# Patient Record
Sex: FEMALE | Race: BLACK OR AFRICAN AMERICAN | NOT HISPANIC OR LATINO | URBAN - METROPOLITAN AREA
[De-identification: names, ages, dates, MRNs, and addresses within clinical notes are randomized per-mention and may not be internally consistent; named-entity substitution may affect disease eponyms.]

---

## 2018-10-22 ENCOUNTER — OUTPATIENT (OUTPATIENT)
Dept: OUTPATIENT SERVICES | Facility: HOSPITAL | Age: 83
LOS: 1 days | End: 2018-10-22
Payer: MEDICARE

## 2018-10-22 DIAGNOSIS — I49.9 CARDIAC ARRHYTHMIA, UNSPECIFIED: ICD-10-CM

## 2018-10-22 DIAGNOSIS — Z95.5 PRESENCE OF CORONARY ANGIOPLASTY IMPLANT AND GRAFT: Chronic | ICD-10-CM

## 2018-10-22 PROCEDURE — 93227 XTRNL ECG REC<48 HR R&I: CPT

## 2018-10-24 PROCEDURE — 93225 XTRNL ECG REC<48 HRS REC: CPT

## 2020-01-22 ENCOUNTER — EMERGENCY (EMERGENCY)
Facility: HOSPITAL | Age: 85
LOS: 1 days | Discharge: HOME | End: 2020-01-22
Attending: EMERGENCY MEDICINE | Admitting: EMERGENCY MEDICINE
Payer: MEDICARE

## 2020-01-22 VITALS
RESPIRATION RATE: 20 BRPM | HEART RATE: 93 BPM | TEMPERATURE: 99 F | SYSTOLIC BLOOD PRESSURE: 153 MMHG | OXYGEN SATURATION: 96 % | WEIGHT: 154.98 LBS | HEIGHT: 61 IN | DIASTOLIC BLOOD PRESSURE: 84 MMHG

## 2020-01-22 DIAGNOSIS — Z95.5 PRESENCE OF CORONARY ANGIOPLASTY IMPLANT AND GRAFT: Chronic | ICD-10-CM

## 2020-01-22 LAB
FLU A RESULT: SIGNIFICANT CHANGE UP
FLU A RESULT: SIGNIFICANT CHANGE UP
FLUAV AG NPH QL: SIGNIFICANT CHANGE UP
FLUBV AG NPH QL: SIGNIFICANT CHANGE UP
RSV RESULT: SIGNIFICANT CHANGE UP
RSV RNA RESP QL NAA+PROBE: SIGNIFICANT CHANGE UP

## 2020-01-22 PROCEDURE — 71046 X-RAY EXAM CHEST 2 VIEWS: CPT | Mod: 26

## 2020-01-22 PROCEDURE — 99284 EMERGENCY DEPT VISIT MOD MDM: CPT

## 2020-01-22 NOTE — ED PROVIDER NOTE - PROGRESS NOTE DETAILS
On re-evaluation, pt is AAox3 and in NAD. vitals stable. Pt well appearing in ED. Rx for augmentin given. Pt given augmentin po in ED. instructions given to follow up with pmd as outpatient, return to ED for worsening condition. Pt expresses understanding.

## 2020-01-22 NOTE — ED PROVIDER NOTE - OBJECTIVE STATEMENT
83 y/o F pt with PMHx of Afib, DM, and HTN, and PSHx of cardiac stents presents to ED c/o cough, cold, congestion x 2 days. Pt states she's had fever 2 days ago and her last fever was last night which improved after taking Tylenol. Pt endorses being concerned for the flu due to her age and didn't want it to worsen. Pt is well appearing and denies chest pain, shortness of breath, and any other acute complaints.

## 2020-01-22 NOTE — ED PROVIDER NOTE - CLINICAL SUMMARY MEDICAL DECISION MAKING FREE TEXT BOX
83 y/o F pt presents to ED with cough and congestion. Will obtain CXR to r/o pneumonia, labs, and reassess.

## 2020-01-22 NOTE — ED ADULT NURSE NOTE - OBJECTIVE STATEMENT
Pt presents complaining of flu like symptoms since monday, pt given mask in triage. PT reports brown thick mucous with occasional streaks of blood, fevers, and cough. PT reports taking tylenol at 4pm today. PT reports hx of 2 cardiac stents, htn, and DM.

## 2020-01-22 NOTE — ED ADULT TRIAGE NOTE - ARRIVAL INFO ADDITIONAL COMMENTS
cough with "bloody" sputum (takes warfarin), runny nose, sore throat, reports subjective fever, has been taking tylenol. denies nausea, vomiting, diarrhea, chest pain

## 2020-01-22 NOTE — ED PROVIDER NOTE - PATIENT PORTAL LINK FT
You can access the FollowMyHealth Patient Portal offered by [facility] by registering at the following website: [email or web address]. By joining [employer]’s FollowMyHealth portal, you will also be able to view your health information using other applications (apps) compatible with our system.

## 2020-01-23 VITALS
SYSTOLIC BLOOD PRESSURE: 145 MMHG | OXYGEN SATURATION: 98 % | DIASTOLIC BLOOD PRESSURE: 72 MMHG | HEART RATE: 82 BPM | RESPIRATION RATE: 20 BRPM | TEMPERATURE: 98 F

## 2020-01-23 PROCEDURE — 85025 COMPLETE CBC W/AUTO DIFF WBC: CPT

## 2020-01-23 PROCEDURE — 85730 THROMBOPLASTIN TIME PARTIAL: CPT

## 2020-01-23 PROCEDURE — 85610 PROTHROMBIN TIME: CPT

## 2020-01-23 PROCEDURE — 99283 EMERGENCY DEPT VISIT LOW MDM: CPT | Mod: 25

## 2020-01-23 PROCEDURE — 71046 X-RAY EXAM CHEST 2 VIEWS: CPT

## 2020-01-23 PROCEDURE — 80053 COMPREHEN METABOLIC PANEL: CPT

## 2020-01-23 PROCEDURE — 87631 RESP VIRUS 3-5 TARGETS: CPT

## 2020-01-23 PROCEDURE — 36415 COLL VENOUS BLD VENIPUNCTURE: CPT

## 2020-01-23 PROCEDURE — 82962 GLUCOSE BLOOD TEST: CPT

## 2020-01-23 RX ADMIN — Medication 1 TABLET(S): at 00:06

## 2020-01-27 DIAGNOSIS — J18.9 PNEUMONIA, UNSPECIFIED ORGANISM: ICD-10-CM

## 2020-01-27 DIAGNOSIS — R05 COUGH: ICD-10-CM

## 2020-01-30 ENCOUNTER — OUTPATIENT (OUTPATIENT)
Dept: OUTPATIENT SERVICES | Facility: HOSPITAL | Age: 85
LOS: 1 days | End: 2020-01-30
Payer: MEDICARE

## 2020-01-30 DIAGNOSIS — Z95.5 PRESENCE OF CORONARY ANGIOPLASTY IMPLANT AND GRAFT: Chronic | ICD-10-CM

## 2020-01-30 PROCEDURE — 71046 X-RAY EXAM CHEST 2 VIEWS: CPT

## 2020-01-30 PROCEDURE — 71046 X-RAY EXAM CHEST 2 VIEWS: CPT | Mod: 26

## 2021-07-07 ENCOUNTER — EMERGENCY (EMERGENCY)
Facility: HOSPITAL | Age: 86
LOS: 1 days | Discharge: HOME | End: 2021-07-07
Attending: EMERGENCY MEDICINE | Admitting: EMERGENCY MEDICINE
Payer: MEDICARE

## 2021-07-07 VITALS
HEART RATE: 82 BPM | RESPIRATION RATE: 18 BRPM | OXYGEN SATURATION: 99 % | DIASTOLIC BLOOD PRESSURE: 90 MMHG | SYSTOLIC BLOOD PRESSURE: 170 MMHG

## 2021-07-07 VITALS
SYSTOLIC BLOOD PRESSURE: 172 MMHG | OXYGEN SATURATION: 98 % | DIASTOLIC BLOOD PRESSURE: 79 MMHG | WEIGHT: 159.84 LBS | HEIGHT: 61 IN | HEART RATE: 79 BPM | TEMPERATURE: 98 F | RESPIRATION RATE: 18 BRPM

## 2021-07-07 DIAGNOSIS — I10 ESSENTIAL (PRIMARY) HYPERTENSION: ICD-10-CM

## 2021-07-07 DIAGNOSIS — E11.9 TYPE 2 DIABETES MELLITUS WITHOUT COMPLICATIONS: ICD-10-CM

## 2021-07-07 DIAGNOSIS — M54.12 RADICULOPATHY, CERVICAL REGION: ICD-10-CM

## 2021-07-07 DIAGNOSIS — Z95.5 PRESENCE OF CORONARY ANGIOPLASTY IMPLANT AND GRAFT: ICD-10-CM

## 2021-07-07 DIAGNOSIS — R20.0 ANESTHESIA OF SKIN: ICD-10-CM

## 2021-07-07 DIAGNOSIS — Z95.5 PRESENCE OF CORONARY ANGIOPLASTY IMPLANT AND GRAFT: Chronic | ICD-10-CM

## 2021-07-07 DIAGNOSIS — I48.91 UNSPECIFIED ATRIAL FIBRILLATION: ICD-10-CM

## 2021-07-07 DIAGNOSIS — Z20.822 CONTACT WITH AND (SUSPECTED) EXPOSURE TO COVID-19: ICD-10-CM

## 2021-07-07 DIAGNOSIS — Z79.84 LONG TERM (CURRENT) USE OF ORAL HYPOGLYCEMIC DRUGS: ICD-10-CM

## 2021-07-07 LAB
ALBUMIN SERPL ELPH-MCNC: 4.3 G/DL — SIGNIFICANT CHANGE UP (ref 3.3–5)
ALP SERPL-CCNC: 105 U/L — SIGNIFICANT CHANGE UP (ref 40–120)
ALT FLD-CCNC: SIGNIFICANT CHANGE UP U/L (ref 10–45)
ANION GAP SERPL CALC-SCNC: 12 MMOL/L — SIGNIFICANT CHANGE UP (ref 5–17)
APTT BLD: 43.5 SEC — HIGH (ref 27.5–35.5)
AST SERPL-CCNC: SIGNIFICANT CHANGE UP U/L (ref 10–40)
BASOPHILS # BLD AUTO: 0.01 K/UL — SIGNIFICANT CHANGE UP (ref 0–0.2)
BASOPHILS NFR BLD AUTO: 0.2 % — SIGNIFICANT CHANGE UP (ref 0–2)
BILIRUB SERPL-MCNC: 0.3 MG/DL — SIGNIFICANT CHANGE UP (ref 0.2–1.2)
BUN SERPL-MCNC: 11 MG/DL — SIGNIFICANT CHANGE UP (ref 7–23)
CALCIUM SERPL-MCNC: 9.4 MG/DL — SIGNIFICANT CHANGE UP (ref 8.4–10.5)
CHLORIDE SERPL-SCNC: 104 MMOL/L — SIGNIFICANT CHANGE UP (ref 96–108)
CO2 SERPL-SCNC: 27 MMOL/L — SIGNIFICANT CHANGE UP (ref 22–31)
CREAT SERPL-MCNC: 0.83 MG/DL — SIGNIFICANT CHANGE UP (ref 0.5–1.3)
EOSINOPHIL # BLD AUTO: 0.08 K/UL — SIGNIFICANT CHANGE UP (ref 0–0.5)
EOSINOPHIL NFR BLD AUTO: 1.6 % — SIGNIFICANT CHANGE UP (ref 0–6)
GLUCOSE SERPL-MCNC: 179 MG/DL — HIGH (ref 70–99)
HCT VFR BLD CALC: 36.9 % — SIGNIFICANT CHANGE UP (ref 34.5–45)
HGB BLD-MCNC: 11.6 G/DL — SIGNIFICANT CHANGE UP (ref 11.5–15.5)
IMM GRANULOCYTES NFR BLD AUTO: 0.4 % — SIGNIFICANT CHANGE UP (ref 0–1.5)
INR BLD: 2.05 — HIGH (ref 0.88–1.16)
LYMPHOCYTES # BLD AUTO: 1.17 K/UL — SIGNIFICANT CHANGE UP (ref 1–3.3)
LYMPHOCYTES # BLD AUTO: 22.8 % — SIGNIFICANT CHANGE UP (ref 13–44)
MCHC RBC-ENTMCNC: 28.9 PG — SIGNIFICANT CHANGE UP (ref 27–34)
MCHC RBC-ENTMCNC: 31.4 GM/DL — LOW (ref 32–36)
MCV RBC AUTO: 92 FL — SIGNIFICANT CHANGE UP (ref 80–100)
MONOCYTES # BLD AUTO: 0.33 K/UL — SIGNIFICANT CHANGE UP (ref 0–0.9)
MONOCYTES NFR BLD AUTO: 6.4 % — SIGNIFICANT CHANGE UP (ref 2–14)
NEUTROPHILS # BLD AUTO: 3.52 K/UL — SIGNIFICANT CHANGE UP (ref 1.8–7.4)
NEUTROPHILS NFR BLD AUTO: 68.6 % — SIGNIFICANT CHANGE UP (ref 43–77)
NRBC # BLD: 0 /100 WBCS — SIGNIFICANT CHANGE UP (ref 0–0)
PLATELET # BLD AUTO: 302 K/UL — SIGNIFICANT CHANGE UP (ref 150–400)
POTASSIUM SERPL-MCNC: SIGNIFICANT CHANGE UP MMOL/L (ref 3.5–5.3)
POTASSIUM SERPL-SCNC: SIGNIFICANT CHANGE UP MMOL/L (ref 3.5–5.3)
PROT SERPL-MCNC: 7.3 G/DL — SIGNIFICANT CHANGE UP (ref 6–8.3)
PROTHROM AB SERPL-ACNC: 23.8 SEC — HIGH (ref 10.6–13.6)
RBC # BLD: 4.01 M/UL — SIGNIFICANT CHANGE UP (ref 3.8–5.2)
RBC # FLD: 13.5 % — SIGNIFICANT CHANGE UP (ref 10.3–14.5)
SARS-COV-2 RNA SPEC QL NAA+PROBE: NEGATIVE — SIGNIFICANT CHANGE UP
SODIUM SERPL-SCNC: 143 MMOL/L — SIGNIFICANT CHANGE UP (ref 135–145)
TROPONIN T SERPL-MCNC: 0.01 NG/ML — SIGNIFICANT CHANGE UP (ref 0–0.01)
WBC # BLD: 5.13 K/UL — SIGNIFICANT CHANGE UP (ref 3.8–10.5)
WBC # FLD AUTO: 5.13 K/UL — SIGNIFICANT CHANGE UP (ref 3.8–10.5)

## 2021-07-07 PROCEDURE — 87635 SARS-COV-2 COVID-19 AMP PRB: CPT

## 2021-07-07 PROCEDURE — 99285 EMERGENCY DEPT VISIT HI MDM: CPT

## 2021-07-07 PROCEDURE — 85610 PROTHROMBIN TIME: CPT

## 2021-07-07 PROCEDURE — 99284 EMERGENCY DEPT VISIT MOD MDM: CPT | Mod: 25

## 2021-07-07 PROCEDURE — 93005 ELECTROCARDIOGRAM TRACING: CPT

## 2021-07-07 PROCEDURE — 93010 ELECTROCARDIOGRAM REPORT: CPT

## 2021-07-07 PROCEDURE — 80053 COMPREHEN METABOLIC PANEL: CPT

## 2021-07-07 PROCEDURE — 70450 CT HEAD/BRAIN W/O DYE: CPT

## 2021-07-07 PROCEDURE — 82962 GLUCOSE BLOOD TEST: CPT

## 2021-07-07 PROCEDURE — 85025 COMPLETE CBC W/AUTO DIFF WBC: CPT

## 2021-07-07 PROCEDURE — 85730 THROMBOPLASTIN TIME PARTIAL: CPT

## 2021-07-07 PROCEDURE — 36415 COLL VENOUS BLD VENIPUNCTURE: CPT

## 2021-07-07 PROCEDURE — 72125 CT NECK SPINE W/O DYE: CPT

## 2021-07-07 PROCEDURE — 84484 ASSAY OF TROPONIN QUANT: CPT

## 2021-07-07 PROCEDURE — 70450 CT HEAD/BRAIN W/O DYE: CPT | Mod: 26,MH

## 2021-07-07 PROCEDURE — 72125 CT NECK SPINE W/O DYE: CPT | Mod: 26,MH

## 2021-07-07 NOTE — ED PROVIDER NOTE - OBJECTIVE STATEMENT
86 F w n R arm pain/numbness int x 2 days- pain brought on w changes in head position- neck movement- noticed last night when moved her neck- felt a pressure like pain in R upper arm and numbness to hand- sx int last a few minutes improve when she's standing- worse w certain neck movements  no dizzy/cp/sob/lightheadedness  no meds taken for pain  no weakness/speach abnml  mod-severity

## 2021-07-07 NOTE — ED PROVIDER NOTE - CARE PROVIDER_API CALL
Raffi Woody)  Neurosurgery  [address], [place] [facility]  Phone: [phone]  Fax: [phone]  Follow Up Time:

## 2021-07-07 NOTE — ED PROVIDER NOTE - NSFOLLOWUPINSTRUCTIONS_ED_ALL_ED_FT
CERVICAL RADICULOPATHY - AfterCare(R) Instructions(ER/ED)           Cervical Radiculopathy    WHAT YOU NEED TO KNOW:    Cervical radiculopathy is a painful condition that happens when a spinal nerve in your neck is pinched or irritated.     Vertebral Column         DISCHARGE INSTRUCTIONS:    Medicines: You may need any of the following:  •NSAIDs help decrease swelling and pain. This medicine can be bought without a doctor's order. This medicine can cause stomach bleeding or kidney problems in certain people. If you take blood thinner medicine, always ask your healthcare provider if NSAIDs are safe for you. Always read the medicine label and follow the directions on it before using this medicine.      •Prescription pain medicine helps decrease pain. Do not wait until the pain is severe before you take this medicine.      •Steroids help decrease pain and swelling. These may be given as a pill or as an injection in your neck. You may need more than 1 injection if your symptoms do not improve after the first treatment.       •Take your medicine as directed. Contact your healthcare provider if you think your medicine is not helping or if you have side effects. Tell him of her if you are allergic to any medicine. Keep a list of the medicines, vitamins, and herbs you take. Include the amounts, and when and why you take them. Bring the list or the pill bottles to follow-up visits. Carry your medicine list with you in case of an emergency.      Follow up with your healthcare provider or spine specialist as directed: Write down your questions so you remember to ask them during your visits.     Physical therapy: Your healthcare provider may suggest physical therapy to stretch and strengthen your muscles. Your physical therapist can teach you how to improve your posture and the way you hold your neck. He may also teach you how to be safely active and avoid further injury. He can also help you develop an exercise program that is safe for your back and neck.     Self-care:   •Ice helps decrease swelling and pain. Ice may also help prevent tissue damage. Use an ice pack, or put crushed ice in a plastic bag. Cover it with a towel and place it on your neck for 15 to 20 minutes every hour or as directed.      •Rest when you feel it is needed. Slowly start to do more each day. Return to your daily activities as directed.       •Wear a soft collar. You may be given a soft collar to support your neck while you sleep. Wear the soft collar only as directed.  Cervical Collars           •Do light stretches and regular exercise. Your healthcare provider may suggest light stretches to help decrease stiffness in your neck and arm as you recover. After your pain is controlled, you may benefit from regular exercise. Ask what type of exercise is safe for your back and neck.       •Review your work area. A comfortable work area can help prevent neck strain. Ask your employer for an ergonomic review to check the position of your desk, chair, phone, and computer. Make any necessary adjustments for your comfort.       Contact your healthcare provider or spine specialist if:   •You have a fever.      •You are losing weight without trying.      •Your pain is worse, even with medicine.      •One or both hands feel more numb than before, or you cannot move your fingers well.      •You have questions or concerns about your condition or care.         © Copyright [employer] 2021           back to top                          © Copyright [employer] 2021

## 2021-07-07 NOTE — ED PROVIDER NOTE - CLINICAL SUMMARY MEDICAL DECISION MAKING FREE TEXT BOX
CT neg for stroke- clinically sx more c/w Cervical Radiculopathy- fu w SPine/PM & R as outpx  d/w Dr Bloom- agrees w plan

## 2021-07-07 NOTE — ED ADULT NURSE NOTE - OBJECTIVE STATEMENT
86y F, A&ox3, presents to ed for intermittent numbness to right arm with pain. PT reports worse with positioning of laying to right side. Pt endorses numbness to right arm traveling down, pt also endorses some intermittent numbness to left hand. No slurring of speech, no dizziness. Strength and sensation equal bilateral. Pt reports when pt sitting up, improvement.

## 2021-07-07 NOTE — ED ADULT TRIAGE NOTE - CHIEF COMPLAINT QUOTE
x 1 day of R arm numbness. Pt states " I had really bad numbness last night and I woke up with it. I don't know what time it started. I don't know the specific times." went to sleep around 11 pm yesterday and had the R arm numbness. does not recall the time she woke up today.

## 2021-07-07 NOTE — ED ADULT NURSE NOTE - NSIMPLEMENTINTERV_GEN_ALL_ED
Implemented All Fall with Harm Risk Interventions:  [place] to call system. Call bell, personal items and telephone within reach. Instruct patient to call for assistance. Room bathroom lighting operational. Non-slip footwear when patient is off stretcher. Physically safe environment: no spills, clutter or unnecessary equipment. Stretcher in lowest position, wheels locked, appropriate side rails in place. Provide visual cue, wrist band, yellow gown, etc. Monitor gait and stability. Monitor for mental status changes and reorient to person, place, and time. Review medications for side effects contributing to fall risk. Reinforce activity limits and safety measures with patient and family. Provide visual clues: red socks.

## 2021-07-07 NOTE — ED PROVIDER NOTE - PATIENT PORTAL LINK FT
You can access the FollowMyHealth Patient Portal offered by [facility] [place] by registering at the following website: [email or web address]. By joining [employer]’s FollowMyHealth portal, you will also be able to view your health information using other applications (apps) compatible with our system.

## 2021-08-13 ENCOUNTER — OUTPATIENT (OUTPATIENT)
Dept: OUTPATIENT SERVICES | Facility: HOSPITAL | Age: 86
LOS: 1 days | End: 2021-08-13
Payer: MEDICARE

## 2021-08-13 ENCOUNTER — APPOINTMENT (OUTPATIENT)
Dept: MRI IMAGING | Facility: HOSPITAL | Age: 86
End: 2021-08-13
Payer: MEDICARE

## 2021-08-13 DIAGNOSIS — Z95.5 PRESENCE OF CORONARY ANGIOPLASTY IMPLANT AND GRAFT: Chronic | ICD-10-CM

## 2021-08-13 PROCEDURE — 72141 MRI NECK SPINE W/O DYE: CPT | Mod: 26,MH

## 2021-08-13 PROCEDURE — 72141 MRI NECK SPINE W/O DYE: CPT

## 2022-05-18 ENCOUNTER — APPOINTMENT (OUTPATIENT)
Dept: HEART AND VASCULAR | Facility: CLINIC | Age: 87
End: 2022-05-18
Payer: MEDICARE

## 2022-05-18 ENCOUNTER — NON-APPOINTMENT (OUTPATIENT)
Age: 87
End: 2022-05-18

## 2022-05-18 VITALS
WEIGHT: 153 LBS | BODY MASS INDEX: 28.89 KG/M2 | HEART RATE: 69 BPM | DIASTOLIC BLOOD PRESSURE: 72 MMHG | HEIGHT: 61 IN | TEMPERATURE: 97.2 F | SYSTOLIC BLOOD PRESSURE: 145 MMHG

## 2022-05-18 DIAGNOSIS — Z83.3 FAMILY HISTORY OF DIABETES MELLITUS: ICD-10-CM

## 2022-05-18 DIAGNOSIS — Z82.0 FAMILY HISTORY OF EPILEPSY AND OTHER DISEASES OF THE NERVOUS SYSTEM: ICD-10-CM

## 2022-05-18 PROCEDURE — 99204 OFFICE O/P NEW MOD 45 MIN: CPT | Mod: 25

## 2022-05-18 PROCEDURE — 93000 ELECTROCARDIOGRAM COMPLETE: CPT

## 2022-05-18 PROCEDURE — 36415 COLL VENOUS BLD VENIPUNCTURE: CPT

## 2022-05-18 NOTE — PHYSICAL EXAM

## 2022-05-18 NOTE — REVIEW OF SYSTEMS
[Feeling Fatigued] : feeling fatigued [Dyspnea on exertion] : dyspnea during exertion [Palpitations] : palpitations [Joint Pain] : joint pain [Negative] : Heme/Lymph

## 2022-05-18 NOTE — HISTORY OF PRESENT ILLNESS
[FreeTextEntry1] : pt is a daibetic on metformen pt has atrial fibrillation presented she might have had a small strokm3 years ago pt on metformen 100mg po bid amlodipine 10mg po qd atenolol 50mg po bid and warfarin 2.5 x5 5 x2 days\par Simran narayan

## 2022-05-18 NOTE — ASSESSMENT
[FreeTextEntry1] : Patient with history of atrial fibrillation diabetes and hypertension patient appears to have had a prolonged TIA patient is on Coumadin we will check INR today and will schedule echocardiogram next visit will obtain records from Dr. Bloom

## 2022-05-19 ENCOUNTER — NON-APPOINTMENT (OUTPATIENT)
Age: 87
End: 2022-05-19

## 2022-05-25 ENCOUNTER — NON-APPOINTMENT (OUTPATIENT)
Age: 87
End: 2022-05-25

## 2022-06-06 LAB
ALBUMIN SERPL ELPH-MCNC: 4.5 G/DL
ALP BLD-CCNC: 108 U/L
ALT SERPL-CCNC: 13 U/L
ANION GAP SERPL CALC-SCNC: 10 MMOL/L
APTT BLD: 47.4 SEC
AST SERPL-CCNC: 21 U/L
BASOPHILS # BLD AUTO: 0.01 K/UL
BASOPHILS NFR BLD AUTO: 0.2 %
BILIRUB SERPL-MCNC: 0.3 MG/DL
BUN SERPL-MCNC: 12 MG/DL
CALCIUM SERPL-MCNC: 9.3 MG/DL
CHLORIDE SERPL-SCNC: 107 MMOL/L
CHOLEST SERPL-MCNC: 170 MG/DL
CK SERPL-CCNC: 92 U/L
CO2 SERPL-SCNC: 28 MMOL/L
CREAT SERPL-MCNC: 0.9 MG/DL
EGFR: 62 ML/MIN/1.73M2
EOSINOPHIL # BLD AUTO: 0.05 K/UL
EOSINOPHIL NFR BLD AUTO: 0.9 %
ESTIMATED AVERAGE GLUCOSE: 134 MG/DL
FERRITIN SERPL-MCNC: 15 NG/ML
GLUCOSE SERPL-MCNC: 131 MG/DL
HBA1C MFR BLD HPLC: 6.3 %
HCT VFR BLD CALC: 39.5 %
HDLC SERPL-MCNC: 44 MG/DL
HGB BLD-MCNC: 11.7 G/DL
IMM GRANULOCYTES NFR BLD AUTO: 0.6 %
INR PPP: 2.07 RATIO
LDLC SERPL CALC-MCNC: 108 MG/DL
LYMPHOCYTES # BLD AUTO: 1.44 K/UL
LYMPHOCYTES NFR BLD AUTO: 27.2 %
MAN DIFF?: NORMAL
MCHC RBC-ENTMCNC: 28.1 PG
MCHC RBC-ENTMCNC: 29.6 GM/DL
MCV RBC AUTO: 95 FL
MONOCYTES # BLD AUTO: 0.45 K/UL
MONOCYTES NFR BLD AUTO: 8.5 %
NEUTROPHILS # BLD AUTO: 3.31 K/UL
NEUTROPHILS NFR BLD AUTO: 62.6 %
NONHDLC SERPL-MCNC: 125 MG/DL
PLATELET # BLD AUTO: 311 K/UL
POTASSIUM SERPL-SCNC: 4.6 MMOL/L
PROT SERPL-MCNC: 6.9 G/DL
PT BLD: 24.4 SEC
RBC # BLD: 4.16 M/UL
RBC # FLD: 14.5 %
SODIUM SERPL-SCNC: 146 MMOL/L
TRIGL SERPL-MCNC: 88 MG/DL
TSH SERPL-ACNC: 1.5 UIU/ML
VIT B12 SERPL-MCNC: 202 PG/ML
WBC # FLD AUTO: 5.29 K/UL

## 2022-06-13 ENCOUNTER — APPOINTMENT (OUTPATIENT)
Dept: HEART AND VASCULAR | Facility: CLINIC | Age: 87
End: 2022-06-13

## 2022-06-13 PROCEDURE — 96372 THER/PROPH/DIAG INJ SC/IM: CPT

## 2022-06-28 ENCOUNTER — NON-APPOINTMENT (OUTPATIENT)
Age: 87
End: 2022-06-28

## 2022-06-28 ENCOUNTER — APPOINTMENT (OUTPATIENT)
Dept: HEART AND VASCULAR | Facility: CLINIC | Age: 87
End: 2022-06-28

## 2022-06-28 VITALS
BODY MASS INDEX: 28.7 KG/M2 | HEIGHT: 61 IN | DIASTOLIC BLOOD PRESSURE: 87 MMHG | TEMPERATURE: 96.8 F | WEIGHT: 152 LBS | OXYGEN SATURATION: 98 % | SYSTOLIC BLOOD PRESSURE: 147 MMHG | HEART RATE: 68 BPM

## 2022-06-28 PROCEDURE — 93000 ELECTROCARDIOGRAM COMPLETE: CPT

## 2022-06-28 PROCEDURE — 93306 TTE W/DOPPLER COMPLETE: CPT

## 2022-06-28 PROCEDURE — 36415 COLL VENOUS BLD VENIPUNCTURE: CPT

## 2022-06-28 PROCEDURE — 96372 THER/PROPH/DIAG INJ SC/IM: CPT

## 2022-06-28 PROCEDURE — 99214 OFFICE O/P EST MOD 30 MIN: CPT | Mod: 25

## 2022-06-28 RX ADMIN — CYANOCOBALAMIN 0 MCG/ML: 1000 INJECTION INTRAMUSCULAR; SUBCUTANEOUS at 00:00

## 2022-06-28 NOTE — REASON FOR VISIT
[CV Risk Factors and Non-Cardiac Disease] : CV risk factors and non-cardiac disease [Arrhythmia/ECG Abnorrmalities] : arrhythmia/ECG abnormalities [Structural Heart and Valve Disease] : structural heart and valve disease

## 2022-06-28 NOTE — ASSESSMENT
[FreeTextEntry1] : Patient with history of atrial fibrillation diabetes and hypertension patient appears to have had a prolonged TIA patient is on Coumadin inr 2.07  pt s/p stent to  rca  echocardiogram shows mr dilated la pt given b12 for 203 check inr today

## 2022-06-28 NOTE — PHYSICAL EXAM

## 2022-06-30 LAB
ALBUMIN SERPL ELPH-MCNC: 4.7 G/DL
ALP BLD-CCNC: 106 U/L
ALT SERPL-CCNC: 12 U/L
ANION GAP SERPL CALC-SCNC: 12 MMOL/L
AST SERPL-CCNC: 21 U/L
BASOPHILS # BLD AUTO: 0.02 K/UL
BASOPHILS NFR BLD AUTO: 0.4 %
BILIRUB SERPL-MCNC: 0.3 MG/DL
BUN SERPL-MCNC: 12 MG/DL
CALCIUM SERPL-MCNC: 9.3 MG/DL
CHLORIDE SERPL-SCNC: 105 MMOL/L
CO2 SERPL-SCNC: 28 MMOL/L
CREAT SERPL-MCNC: 0.95 MG/DL
EGFR: 58 ML/MIN/1.73M2
EOSINOPHIL # BLD AUTO: 0.06 K/UL
EOSINOPHIL NFR BLD AUTO: 1.3 %
GLUCOSE SERPL-MCNC: 123 MG/DL
HCT VFR BLD CALC: 39.1 %
HGB BLD-MCNC: 11.7 G/DL
IMM GRANULOCYTES NFR BLD AUTO: 0.4 %
INR PPP: 1.69 RATIO
LYMPHOCYTES # BLD AUTO: 1.55 K/UL
LYMPHOCYTES NFR BLD AUTO: 33.5 %
MAN DIFF?: NORMAL
MCHC RBC-ENTMCNC: 28.1 PG
MCHC RBC-ENTMCNC: 29.9 GM/DL
MCV RBC AUTO: 94 FL
MONOCYTES # BLD AUTO: 0.42 K/UL
MONOCYTES NFR BLD AUTO: 9.1 %
NEUTROPHILS # BLD AUTO: 2.55 K/UL
NEUTROPHILS NFR BLD AUTO: 55.3 %
PLATELET # BLD AUTO: 273 K/UL
POTASSIUM SERPL-SCNC: 4 MMOL/L
PROT SERPL-MCNC: 7.2 G/DL
PT BLD: 20.1 SEC
RBC # BLD: 4.16 M/UL
RBC # FLD: 14.4 %
SARS-COV-2 N GENE NPH QL NAA+PROBE: NOT DETECTED
SODIUM SERPL-SCNC: 144 MMOL/L
WBC # FLD AUTO: 4.62 K/UL

## 2022-07-09 ENCOUNTER — NON-APPOINTMENT (OUTPATIENT)
Age: 87
End: 2022-07-09

## 2022-08-09 ENCOUNTER — APPOINTMENT (OUTPATIENT)
Dept: HEART AND VASCULAR | Facility: CLINIC | Age: 87
End: 2022-08-09

## 2022-08-09 VITALS
OXYGEN SATURATION: 98 % | WEIGHT: 152 LBS | BODY MASS INDEX: 28.7 KG/M2 | TEMPERATURE: 97.7 F | DIASTOLIC BLOOD PRESSURE: 84 MMHG | HEART RATE: 80 BPM | SYSTOLIC BLOOD PRESSURE: 152 MMHG | HEIGHT: 61 IN

## 2022-08-09 PROCEDURE — 36415 COLL VENOUS BLD VENIPUNCTURE: CPT

## 2022-08-09 PROCEDURE — 99214 OFFICE O/P EST MOD 30 MIN: CPT | Mod: 25

## 2022-08-09 PROCEDURE — 93000 ELECTROCARDIOGRAM COMPLETE: CPT

## 2022-08-09 PROCEDURE — 96372 THER/PROPH/DIAG INJ SC/IM: CPT

## 2022-08-09 RX ORDER — CYANOCOBALAMIN 1000 UG/ML
1000 INJECTION INTRAMUSCULAR; SUBCUTANEOUS
Qty: 0 | Refills: 0 | Status: COMPLETED | OUTPATIENT
Start: 2022-08-09

## 2022-08-09 RX ADMIN — CYANOCOBALAMIN 0 MCG/ML: 1000 INJECTION INTRAMUSCULAR; SUBCUTANEOUS at 00:00

## 2022-08-10 LAB
ALBUMIN SERPL ELPH-MCNC: 4.4 G/DL
ALP BLD-CCNC: 113 U/L
ALT SERPL-CCNC: 12 U/L
ANION GAP SERPL CALC-SCNC: 13 MMOL/L
APTT BLD: 51.2 SEC
AST SERPL-CCNC: 20 U/L
BASOPHILS # BLD AUTO: 0.01 K/UL
BASOPHILS NFR BLD AUTO: 0.2 %
BILIRUB SERPL-MCNC: 0.3 MG/DL
BUN SERPL-MCNC: 10 MG/DL
CALCIUM SERPL-MCNC: 9.6 MG/DL
CHLORIDE SERPL-SCNC: 106 MMOL/L
CO2 SERPL-SCNC: 28 MMOL/L
CREAT SERPL-MCNC: 0.84 MG/DL
EGFR: 68 ML/MIN/1.73M2
EOSINOPHIL # BLD AUTO: 0.08 K/UL
EOSINOPHIL NFR BLD AUTO: 1.5 %
ESTIMATED AVERAGE GLUCOSE: 128 MG/DL
GLUCOSE SERPL-MCNC: 98 MG/DL
HBA1C MFR BLD HPLC: 6.1 %
HCT VFR BLD CALC: 40.3 %
HGB BLD-MCNC: 12 G/DL
IMM GRANULOCYTES NFR BLD AUTO: 0.4 %
INR PPP: 2.75 RATIO
LYMPHOCYTES # BLD AUTO: 1.51 K/UL
LYMPHOCYTES NFR BLD AUTO: 27.9 %
MAN DIFF?: NORMAL
MCHC RBC-ENTMCNC: 28.5 PG
MCHC RBC-ENTMCNC: 29.8 GM/DL
MCV RBC AUTO: 95.7 FL
MONOCYTES # BLD AUTO: 0.46 K/UL
MONOCYTES NFR BLD AUTO: 8.5 %
NEUTROPHILS # BLD AUTO: 3.33 K/UL
NEUTROPHILS NFR BLD AUTO: 61.5 %
PLATELET # BLD AUTO: 297 K/UL
POTASSIUM SERPL-SCNC: 3.8 MMOL/L
PROT SERPL-MCNC: 7.3 G/DL
PT BLD: 32.2 SEC
RBC # BLD: 4.21 M/UL
RBC # FLD: 14.7 %
SODIUM SERPL-SCNC: 146 MMOL/L
VIT B12 SERPL-MCNC: 453 PG/ML
WBC # FLD AUTO: 5.41 K/UL

## 2022-08-10 NOTE — ASSESSMENT
[FreeTextEntry1] : Patient with history of atrial fibrillation diabetes and hypertension patient appears to have had a prolonged TIA patient is on Coumadin inr 2.85  pt s/p stent to  rca  echocardiogram shows mr dilated la pt  felt better after b12 will give monthly

## 2022-08-10 NOTE — PHYSICAL EXAM

## 2022-08-21 ENCOUNTER — FORM ENCOUNTER (OUTPATIENT)
Age: 87
End: 2022-08-21

## 2022-09-20 ENCOUNTER — APPOINTMENT (OUTPATIENT)
Dept: HEART AND VASCULAR | Facility: CLINIC | Age: 87
End: 2022-09-20

## 2022-09-20 VITALS
SYSTOLIC BLOOD PRESSURE: 159 MMHG | HEART RATE: 74 BPM | WEIGHT: 152 LBS | BODY MASS INDEX: 29.84 KG/M2 | OXYGEN SATURATION: 96 % | HEIGHT: 60 IN | DIASTOLIC BLOOD PRESSURE: 78 MMHG | TEMPERATURE: 97.2 F

## 2022-09-20 VITALS — SYSTOLIC BLOOD PRESSURE: 143 MMHG | DIASTOLIC BLOOD PRESSURE: 79 MMHG

## 2022-09-20 PROCEDURE — 36415 COLL VENOUS BLD VENIPUNCTURE: CPT

## 2022-09-20 PROCEDURE — 99213 OFFICE O/P EST LOW 20 MIN: CPT

## 2022-09-20 RX ORDER — CYANOCOBALAMIN 1000 UG/ML
1000 INJECTION INTRAMUSCULAR; SUBCUTANEOUS
Qty: 0 | Refills: 0 | Status: COMPLETED | OUTPATIENT
Start: 2022-06-06

## 2022-09-20 RX ADMIN — CYANOCOBALAMIN 0 MCG/ML: 1000 INJECTION INTRAMUSCULAR; SUBCUTANEOUS at 00:00

## 2022-09-20 NOTE — PHYSICAL EXAM

## 2022-09-20 NOTE — ASSESSMENT
[FreeTextEntry1] : Patient with history of atrial fibrillation diabetes and hypertension patient appears to have had a prolonged TIA patient is on Coumadin inr 2.75  pt s/p stent to  rca  echocardiogram shows mr dilated la pt  felt better after b12 will give monthly

## 2022-09-21 LAB
ALBUMIN SERPL ELPH-MCNC: 4.6 G/DL
ALP BLD-CCNC: 106 U/L
ALT SERPL-CCNC: 15 U/L
ANION GAP SERPL CALC-SCNC: 11 MMOL/L
AST SERPL-CCNC: 22 U/L
BASOPHILS # BLD AUTO: 0.01 K/UL
BASOPHILS NFR BLD AUTO: 0.2 %
BILIRUB SERPL-MCNC: 0.3 MG/DL
BUN SERPL-MCNC: 15 MG/DL
CALCIUM SERPL-MCNC: 9.4 MG/DL
CHLORIDE SERPL-SCNC: 105 MMOL/L
CO2 SERPL-SCNC: 29 MMOL/L
CREAT SERPL-MCNC: 0.91 MG/DL
EGFR: 61 ML/MIN/1.73M2
EOSINOPHIL # BLD AUTO: 0.05 K/UL
EOSINOPHIL NFR BLD AUTO: 0.9 %
GLUCOSE SERPL-MCNC: 90 MG/DL
HCT VFR BLD CALC: 39.7 %
HGB BLD-MCNC: 12.3 G/DL
IMM GRANULOCYTES NFR BLD AUTO: 0.4 %
INR PPP: 2.76 RATIO
LYMPHOCYTES # BLD AUTO: 1.53 K/UL
LYMPHOCYTES NFR BLD AUTO: 28.8 %
MAN DIFF?: NORMAL
MCHC RBC-ENTMCNC: 28.5 PG
MCHC RBC-ENTMCNC: 31 GM/DL
MCV RBC AUTO: 92.1 FL
MONOCYTES # BLD AUTO: 0.44 K/UL
MONOCYTES NFR BLD AUTO: 8.3 %
NEUTROPHILS # BLD AUTO: 3.26 K/UL
NEUTROPHILS NFR BLD AUTO: 61.4 %
PLATELET # BLD AUTO: 328 K/UL
POTASSIUM SERPL-SCNC: 3.7 MMOL/L
PROT SERPL-MCNC: 7.1 G/DL
PT BLD: 32.9 SEC
RBC # BLD: 4.31 M/UL
RBC # FLD: 14.6 %
SODIUM SERPL-SCNC: 144 MMOL/L
VIT B12 SERPL-MCNC: 378 PG/ML
WBC # FLD AUTO: 5.31 K/UL

## 2022-11-01 ENCOUNTER — APPOINTMENT (OUTPATIENT)
Dept: HEART AND VASCULAR | Facility: CLINIC | Age: 87
End: 2022-11-01

## 2022-11-01 VITALS
WEIGHT: 144 LBS | HEIGHT: 60 IN | DIASTOLIC BLOOD PRESSURE: 68 MMHG | BODY MASS INDEX: 28.27 KG/M2 | HEART RATE: 62 BPM | SYSTOLIC BLOOD PRESSURE: 135 MMHG

## 2022-11-01 PROCEDURE — 99213 OFFICE O/P EST LOW 20 MIN: CPT | Mod: 25

## 2022-11-01 PROCEDURE — 96372 THER/PROPH/DIAG INJ SC/IM: CPT

## 2022-11-01 PROCEDURE — 36415 COLL VENOUS BLD VENIPUNCTURE: CPT

## 2022-11-01 RX ORDER — CYANOCOBALAMIN 1000 UG/ML
1000 INJECTION INTRAMUSCULAR; SUBCUTANEOUS
Qty: 0 | Refills: 0 | Status: COMPLETED | OUTPATIENT
Start: 2022-11-01

## 2022-11-01 RX ADMIN — CYANOCOBALAMIN 0 MCG/ML: 1000 INJECTION INTRAMUSCULAR; SUBCUTANEOUS at 00:00

## 2022-11-02 LAB
INR PPP: 2.05 RATIO
PT BLD: 23.9 SEC

## 2022-11-04 NOTE — PHYSICAL EXAM

## 2022-11-04 NOTE — ASSESSMENT
[FreeTextEntry1] : Patient with history of atrial fibrillation diabetes and hypertension patient appears to have had a prolonged TIA patient is on Coumadin inr 2.05  pt s/p stent to  rca  echocardiogram shows mr dilated la pt  felt better after b12 will give monthly

## 2022-12-13 ENCOUNTER — APPOINTMENT (OUTPATIENT)
Dept: HEART AND VASCULAR | Facility: CLINIC | Age: 87
End: 2022-12-13

## 2022-12-13 VITALS
HEART RATE: 74 BPM | SYSTOLIC BLOOD PRESSURE: 134 MMHG | WEIGHT: 148 LBS | TEMPERATURE: 97.7 F | BODY MASS INDEX: 29.06 KG/M2 | DIASTOLIC BLOOD PRESSURE: 69 MMHG | HEIGHT: 60 IN | OXYGEN SATURATION: 100 %

## 2022-12-13 PROCEDURE — 99214 OFFICE O/P EST MOD 30 MIN: CPT | Mod: 25

## 2022-12-13 PROCEDURE — 36415 COLL VENOUS BLD VENIPUNCTURE: CPT

## 2022-12-13 PROCEDURE — 96372 THER/PROPH/DIAG INJ SC/IM: CPT

## 2022-12-13 RX ORDER — WARFARIN 4 MG/1
TABLET ORAL
Refills: 0 | Status: COMPLETED | COMMUNITY
End: 2022-12-13

## 2022-12-13 NOTE — HISTORY OF PRESENT ILLNESS
[FreeTextEntry1] : 87 year old female with PMHX of CAD ( PCI x2  2011 ), AFIB ( Warfarin ) HTN, HLD and DMII here for follow up.\par \par Since last visit, she is overall of feeling well. She continues to walk 10-15 blocks without complications. She denies any chest pains or dyspnea on exertion. \par \par She has occasional edema which resolves with leg elevation. She denies any orthopnea or pnd or cough. \par \par She denies any palpitations, dizziness, syncope or neuro focal deficits. \par \par ECHO 6/2022\par Mild - MOd MR, Min AR, mildly dilated left atrium, mild LVH, EF  56%, normal wall motion

## 2022-12-13 NOTE — REVIEW OF SYSTEMS
[Lower Ext Edema] : lower extremity edema [Fever] : no fever [Chills] : no chills [SOB] : no shortness of breath [Dyspnea on exertion] : not dyspnea during exertion [Chest Discomfort] : no chest discomfort [Leg Claudication] : no intermittent leg claudication [Palpitations] : no palpitations [Orthopnea] : no orthopnea [PND] : no PND [Syncope] : no syncope [Nausea] : no nausea [Vomiting] : no vomiting [Diarrhea] : diarrhea [Easy Bleeding] : no tendency for easy bleeding

## 2022-12-13 NOTE — PHYSICAL EXAM
[Well Developed] : well developed [Well Nourished] : well nourished [No Acute Distress] : no acute distress [No Carotid Bruit] : no carotid bruit [Normal S1, S2] : normal S1, S2 [Clear Lung Fields] : clear lung fields [Good Air Entry] : good air entry [No Respiratory Distress] : no respiratory distress  [Moves all extremities] : moves all extremities [No Focal Deficits] : no focal deficits [Normal Speech] : normal speech [Alert and Oriented] : alert and oriented [Normal memory] : normal memory [de-identified] : 1-2 /6 systolic murmur [de-identified] : LLE 2+, RLE 1+ edema

## 2022-12-13 NOTE — DISCUSSION/SUMMARY
[FreeTextEntry1] : 87 year old female with PMHX of CAD ( PCI to RCA), AFIB ( Warfarin ) HTN, HLD and DMII here for follow up.\par \par CAD: Active without complications. EKG SR 63 with 1st degree block.   ECHO done 6/2022 Mild - Mod MR, Min AR, mildly dilated left atrium, mild LVH, EF  56%, normal wall motion\par AFIB: Asymptomatic. (INR 2-3), Continue with Warfarin and Atenolol 50mg BID. INR sent today\par HTN: Controlled. Continue with Amlodipine 10mg\par HLD: Slightly Elevated 108 (6/2022). Currently not interested in Statins\par DM2: A1c 6.1 ( 8/2022) Continue with Metformin 1000 mg BID\par Edema: Asymptomatic, echocardiogram as above. She is not interested in venous ultrasound. Continue with conservative measures ( decreased salt intake and leg elevation and compression stockings) \par I, Dr. Nixon, personally performed the evaluation and management (E/M) services for this established patient who presents today with (a) new problem(s)/exacerbation of (an) existing condition(s). That E/M includes conducting the clinically appropriate interval history &/or exam, assessing all new/exacerbated conditions, and establishing a new plan of care. Today, my ACP, Sophia Hall, was here to observe &/or participate in the visit & follow plan of care established by me. \par \par

## 2022-12-14 LAB
INR PPP: 1.84 RATIO
PT BLD: 21.5 SEC

## 2023-01-24 ENCOUNTER — APPOINTMENT (OUTPATIENT)
Dept: HEART AND VASCULAR | Facility: CLINIC | Age: 88
End: 2023-01-24
Payer: MEDICARE

## 2023-01-24 VITALS
HEIGHT: 60 IN | OXYGEN SATURATION: 98 % | HEART RATE: 76 BPM | WEIGHT: 152 LBS | DIASTOLIC BLOOD PRESSURE: 72 MMHG | SYSTOLIC BLOOD PRESSURE: 163 MMHG | BODY MASS INDEX: 29.84 KG/M2

## 2023-01-24 PROCEDURE — 96372 THER/PROPH/DIAG INJ SC/IM: CPT

## 2023-01-24 PROCEDURE — 99214 OFFICE O/P EST MOD 30 MIN: CPT | Mod: 25,95

## 2023-01-24 PROCEDURE — 36415 COLL VENOUS BLD VENIPUNCTURE: CPT

## 2023-01-24 RX ORDER — CYANOCOBALAMIN 1000 UG/ML
1000 INJECTION INTRAMUSCULAR; SUBCUTANEOUS
Qty: 0 | Refills: 0 | Status: COMPLETED | OUTPATIENT
Start: 2023-01-24

## 2023-01-24 RX ADMIN — CYANOCOBALAMINE 0 MCG/ML: 1000 INJECTION INTRAMUSCULAR; SUBCUTANEOUS at 00:00

## 2023-01-24 NOTE — DISCUSSION/SUMMARY
[FreeTextEntry1] : 87 year old female with PMHX of CAD ( PCI to RCA), AFIB ( Warfarin ) HTN, HLD and DMII here for follow up.\par \par CAD: Active without complications. EKG SR 63 with 1st degree block.   ECHO done 6/2022 Mild - Mod MR, Min AR, mildly dilated left atrium, mild LVH, EF  56%, normal wall motion\par AFIB: Asymptomatic. (INR 2-3), Continue with Warfarin and Atenolol 50mg BID. INR sent today\par HTN: Controlled. Continue with Amlodipine 10mg\par HLD: Slightly Elevated 108 (6/2022). Currently not interested in Statins age 87\par DM2: A1c 6.1 ( 8/2022) Continue with Metformin 1000 mg BID\par Edema: Asymptomatic, echocardiogram as above. She is not interested in venous ultrasound. Continue with conservative measures ( decreased salt intake and leg elevation and compression stockings) \par \par \par

## 2023-01-24 NOTE — REVIEW OF SYSTEMS
[Feeling Fatigued] : feeling fatigued [Dyspnea on exertion] : not dyspnea during exertion [Palpitations] : no palpitations [Joint Pain] : joint pain [Negative] : Heme/Lymph

## 2023-01-24 NOTE — REASON FOR VISIT
[FreeTextEntry1] : pt on increased coumadin 5x3 2.5 x4 from 2.5 x5 5x3\par no cp no torres mild ankle sweeling [Other Location: e.g. School (Enter Location, City,State)___] : at [unfilled], at the time of the visit. [Other Location: e.g. Home (Enter Location, City,State)___] : at [unfilled] [Arrhythmia/ECG Abnorrmalities] : arrhythmia/ECG abnormalities [Coronary Artery Disease] : coronary artery disease

## 2023-01-26 LAB
ALBUMIN SERPL ELPH-MCNC: 4.5 G/DL
ALP BLD-CCNC: 122 U/L
ALT SERPL-CCNC: 16 U/L
ANION GAP SERPL CALC-SCNC: 11 MMOL/L
APTT BLD: 51.4 SEC
AST SERPL-CCNC: 21 U/L
BASOPHILS # BLD AUTO: 0.02 K/UL
BASOPHILS NFR BLD AUTO: 0.3 %
BILIRUB SERPL-MCNC: 0.5 MG/DL
BUN SERPL-MCNC: 10 MG/DL
CALCIUM SERPL-MCNC: 9.7 MG/DL
CHLORIDE SERPL-SCNC: 103 MMOL/L
CHOLEST SERPL-MCNC: 172 MG/DL
CK SERPL-CCNC: 82 U/L
CO2 SERPL-SCNC: 30 MMOL/L
CREAT SERPL-MCNC: 0.89 MG/DL
EGFR: 63 ML/MIN/1.73M2
EOSINOPHIL # BLD AUTO: 0.05 K/UL
EOSINOPHIL NFR BLD AUTO: 0.9 %
ESTIMATED AVERAGE GLUCOSE: 126 MG/DL
FERRITIN SERPL-MCNC: 16 NG/ML
GLUCOSE SERPL-MCNC: 140 MG/DL
HBA1C MFR BLD HPLC: 6 %
HCT VFR BLD CALC: 39.5 %
HDLC SERPL-MCNC: 50 MG/DL
HGB BLD-MCNC: 12.4 G/DL
IMM GRANULOCYTES NFR BLD AUTO: 0.3 %
INR PPP: 1.99 RATIO
LDLC SERPL CALC-MCNC: 102 MG/DL
LYMPHOCYTES # BLD AUTO: 1.55 K/UL
LYMPHOCYTES NFR BLD AUTO: 26.8 %
MAN DIFF?: NORMAL
MCHC RBC-ENTMCNC: 28 PG
MCHC RBC-ENTMCNC: 31.4 GM/DL
MCV RBC AUTO: 89.2 FL
MONOCYTES # BLD AUTO: 0.5 K/UL
MONOCYTES NFR BLD AUTO: 8.6 %
NEUTROPHILS # BLD AUTO: 3.65 K/UL
NEUTROPHILS NFR BLD AUTO: 63.1 %
NONHDLC SERPL-MCNC: 122 MG/DL
PLATELET # BLD AUTO: 373 K/UL
POTASSIUM SERPL-SCNC: 3.4 MMOL/L
PROT SERPL-MCNC: 7.5 G/DL
PT BLD: 23.6 SEC
RBC # BLD: 4.43 M/UL
RBC # FLD: 14.1 %
SODIUM SERPL-SCNC: 144 MMOL/L
TRIGL SERPL-MCNC: 101 MG/DL
TSH SERPL-ACNC: 1.5 UIU/ML
WBC # FLD AUTO: 5.79 K/UL

## 2023-03-07 ENCOUNTER — APPOINTMENT (OUTPATIENT)
Dept: HEART AND VASCULAR | Facility: CLINIC | Age: 88
End: 2023-03-07
Payer: MEDICARE

## 2023-03-07 ENCOUNTER — NON-APPOINTMENT (OUTPATIENT)
Age: 88
End: 2023-03-07

## 2023-03-07 VITALS
SYSTOLIC BLOOD PRESSURE: 162 MMHG | BODY MASS INDEX: 29.06 KG/M2 | TEMPERATURE: 97.3 F | HEART RATE: 69 BPM | WEIGHT: 148 LBS | HEIGHT: 60 IN | DIASTOLIC BLOOD PRESSURE: 73 MMHG | OXYGEN SATURATION: 98 %

## 2023-03-07 PROCEDURE — 99214 OFFICE O/P EST MOD 30 MIN: CPT | Mod: 25

## 2023-03-07 RX ORDER — CYANOCOBALAMIN 1000 UG/ML
1000 INJECTION INTRAMUSCULAR; SUBCUTANEOUS
Qty: 0 | Refills: 0 | Status: COMPLETED | OUTPATIENT
Start: 2023-03-07

## 2023-03-08 LAB
INR PPP: 1.94 RATIO
PT BLD: 22.6 SEC

## 2023-03-08 NOTE — HISTORY OF PRESENT ILLNESS
[FreeTextEntry1] : Patient was last seen 2023. \par \par SInce then: \par \par HTN: On Amlodipine 10 mg daily and Atenolol 50 mg BID\par \par Afib: On Atenolol 50 mg BID and Warfarin \par \par DM: On Metformin 1000 mg BID\par \par \par ==================================\par Labs/Diagnostics: \par 2023: \par Lipid profile: T, TC: 172, HDL: 50, LDL: 102\par A1c: 6%\par \par ECHO 2022\par Mild - Mod MR, Min AR, mildly dilated left atrium, mild LVH, EF 56%, normal wall motion

## 2023-03-08 NOTE — ASSESSMENT
[FreeTextEntry1] : 87 year old female with PMHx of CAD ( PCI x2 2011 ), AFIB ( Warfarin ) HTN, HLD and DMII here for follow up.\par inr and possible carpal tunnel\par mwf 2.5 rest of week 5mg

## 2023-03-08 NOTE — REASON FOR VISIT
[Arrhythmia/ECG Abnorrmalities] : arrhythmia/ECG abnormalities [Coronary Artery Disease] : coronary artery disease [FreeTextEntry1] : 87 year old female with PMHx of CAD ( PCI x2 2011 ), AFIB ( Warfarin ) HTN, HLD and DMII here for follow up.\par

## 2023-03-13 NOTE — REVIEW OF SYSTEMS
[Feeling Fatigued] : feeling fatigued [Dyspnea on exertion] : not dyspnea during exertion [Palpitations] : no palpitations [Joint Pain] : joint pain [Negative] : Heme/Lymph 45M c/o headache and generalized back pain x "roddy a long time." pt uncooperative with full assessment. giving only minimal information. pt noted to be talking to self. denies visual disturbances, CP/SOB, n/v/d, fevers/chills. pt speaking in clear, complete sentences. RR easy, even, un-labored on room air. pt ambulates with steady gait

## 2023-04-19 ENCOUNTER — NON-APPOINTMENT (OUTPATIENT)
Age: 88
End: 2023-04-19

## 2023-04-19 ENCOUNTER — APPOINTMENT (OUTPATIENT)
Dept: HEART AND VASCULAR | Facility: CLINIC | Age: 88
End: 2023-04-19

## 2023-04-19 ENCOUNTER — APPOINTMENT (OUTPATIENT)
Dept: HEART AND VASCULAR | Facility: CLINIC | Age: 88
End: 2023-04-19
Payer: MEDICARE

## 2023-04-19 VITALS
HEIGHT: 60 IN | OXYGEN SATURATION: 96 % | SYSTOLIC BLOOD PRESSURE: 160 MMHG | HEART RATE: 78 BPM | BODY MASS INDEX: 29.06 KG/M2 | DIASTOLIC BLOOD PRESSURE: 68 MMHG | WEIGHT: 148 LBS | TEMPERATURE: 97.2 F

## 2023-04-19 PROCEDURE — 96372 THER/PROPH/DIAG INJ SC/IM: CPT

## 2023-04-19 PROCEDURE — 36415 COLL VENOUS BLD VENIPUNCTURE: CPT

## 2023-04-19 PROCEDURE — 99214 OFFICE O/P EST MOD 30 MIN: CPT | Mod: 25

## 2023-04-19 PROCEDURE — 93000 ELECTROCARDIOGRAM COMPLETE: CPT

## 2023-04-19 RX ORDER — CYANOCOBALAMIN 1000 UG/ML
1000 INJECTION INTRAMUSCULAR; SUBCUTANEOUS
Qty: 0 | Refills: 0 | Status: COMPLETED | OUTPATIENT
Start: 2023-04-19

## 2023-04-19 RX ADMIN — CYANOCOBALAMIN 0 MCG/ML: 1000 INJECTION INTRAMUSCULAR; SUBCUTANEOUS at 00:00

## 2023-04-19 NOTE — ASSESSMENT
[FreeTextEntry1] : 87 year old female with PMHx of CAD ( PCI x2 2011 ), AFIB ( Warfarin ) HTN, HLD and DMII here for follow up.\par inr and possible carpal tunnel\par increased warfarin to 5x4 2.5mg mwf\par bp 158/70\par b12 given

## 2023-04-19 NOTE — REASON FOR VISIT
[Hypertension] : hypertension [Other: ____] : [unfilled] [Arrhythmia/ECG Abnorrmalities] : arrhythmia/ECG abnormalities [Coronary Artery Disease] : coronary artery disease [FreeTextEntry1] : 87 year old female with PMHx of CAD ( PCI x2 2011 ), AFIB ( Warfarin ) HTN, HLD and DMII here for follow up.\par

## 2023-04-20 LAB
ALBUMIN SERPL ELPH-MCNC: 4.4 G/DL
ALP BLD-CCNC: 123 U/L
ALT SERPL-CCNC: 15 U/L
ANION GAP SERPL CALC-SCNC: 13 MMOL/L
APTT BLD: 49.8 SEC
AST SERPL-CCNC: 22 U/L
BASOPHILS # BLD AUTO: 0.02 K/UL
BASOPHILS NFR BLD AUTO: 0.4 %
BILIRUB SERPL-MCNC: 0.4 MG/DL
BUN SERPL-MCNC: 12 MG/DL
CALCIUM SERPL-MCNC: 9.8 MG/DL
CHLORIDE SERPL-SCNC: 104 MMOL/L
CO2 SERPL-SCNC: 28 MMOL/L
CREAT SERPL-MCNC: 0.86 MG/DL
EGFR: 65 ML/MIN/1.73M2
EOSINOPHIL # BLD AUTO: 0.06 K/UL
EOSINOPHIL NFR BLD AUTO: 1.1 %
ESTIMATED AVERAGE GLUCOSE: 126 MG/DL
GLUCOSE SERPL-MCNC: 134 MG/DL
HBA1C MFR BLD HPLC: 6 %
HCT VFR BLD CALC: 38.4 %
HGB BLD-MCNC: 11.6 G/DL
IMM GRANULOCYTES NFR BLD AUTO: 0.2 %
INR PPP: 2.06 RATIO
LYMPHOCYTES # BLD AUTO: 1.32 K/UL
LYMPHOCYTES NFR BLD AUTO: 24.3 %
MAN DIFF?: NORMAL
MCHC RBC-ENTMCNC: 27.6 PG
MCHC RBC-ENTMCNC: 30.2 GM/DL
MCV RBC AUTO: 91.2 FL
MONOCYTES # BLD AUTO: 0.43 K/UL
MONOCYTES NFR BLD AUTO: 7.9 %
NEUTROPHILS # BLD AUTO: 3.6 K/UL
NEUTROPHILS NFR BLD AUTO: 66.1 %
PLATELET # BLD AUTO: 351 K/UL
POTASSIUM SERPL-SCNC: 3.7 MMOL/L
PROT SERPL-MCNC: 7.2 G/DL
PT BLD: 24.3 SEC
RBC # BLD: 4.21 M/UL
RBC # FLD: 14.6 %
SODIUM SERPL-SCNC: 145 MMOL/L
VIT B12 SERPL-MCNC: 516 PG/ML
WBC # FLD AUTO: 5.44 K/UL

## 2023-04-23 NOTE — REVIEW OF SYSTEMS
[Feeling Fatigued] : feeling fatigued [Joint Pain] : joint pain [Negative] : Heme/Lymph [Dyspnea on exertion] : not dyspnea during exertion [Palpitations] : no palpitations

## 2023-05-24 ENCOUNTER — APPOINTMENT (OUTPATIENT)
Dept: HEART AND VASCULAR | Facility: CLINIC | Age: 88
End: 2023-05-24
Payer: MEDICARE

## 2023-05-24 VITALS
HEART RATE: 74 BPM | SYSTOLIC BLOOD PRESSURE: 120 MMHG | DIASTOLIC BLOOD PRESSURE: 68 MMHG | HEIGHT: 60 IN | WEIGHT: 147 LBS | TEMPERATURE: 97.1 F | OXYGEN SATURATION: 99 % | BODY MASS INDEX: 28.86 KG/M2

## 2023-05-24 PROCEDURE — 99214 OFFICE O/P EST MOD 30 MIN: CPT | Mod: 25

## 2023-05-24 PROCEDURE — 36415 COLL VENOUS BLD VENIPUNCTURE: CPT

## 2023-05-24 NOTE — PHYSICAL EXAM
[Normal Conjunctiva] : normal conjunctiva [Normal Venous Pressure] : normal venous pressure [No Carotid Bruit] : no carotid bruit [Normal S1, S2] : normal S1, S2 [No Murmur] : no murmur [Normal Gait] : normal gait [No Edema] : no edema [Normal] : alert and oriented, normal memory

## 2023-05-25 LAB
INR PPP: 2.08 RATIO
PT BLD: 24.3 SEC

## 2023-05-25 NOTE — HISTORY OF PRESENT ILLNESS
[FreeTextEntry1] : Since her last visit, pt. is doing well overall. \par \par She remains active walking regularly, up to 12 blocks at a time. No exertional chest pain or shortness of breath.\par \par She is taking Warfarin 5 mg x 4 days/week and 2.5 mg on the other days. No GI/ bleeding. \par  \par Pt. also denies any orthopnea, PND, palpitations, lightheadedness or syncope. She reports chronic lower extremity edema. She has not tried compression socks. \par \par ==================================\par Labs/Diagnostics: \par 2023: \par Lipid profile: T, TC: 172, HDL: 50, LDL: 102\par A1c: 6%\par \par ECHO 2022\par Mild - Mod MR, Min AR, mildly dilated left atrium, mild LVH, EF 56%, normal wall motion

## 2023-05-25 NOTE — REASON FOR VISIT
[FreeTextEntry1] : Pt. is a 88 year old F with PMHx of CAD (s/p PCI x2 2011), Afib (on Warfarin), HTN, HLD and DM2 who presents today for follow-up.

## 2023-05-25 NOTE — ASSESSMENT
[FreeTextEntry1] : Pt. is a 88 year old F with PMHx of CAD (s/p PCI x2 2011), Afib (on Warfarin), HTN, HLD and DM2 who presents today for follow-up.\par \par CAD:\par - Stable - no reports of angina or dyspnea on exertion\par \par HTN:\par - Well-controlled\par - Continue Amlodipine 10 mg daily and Atenolol 50 mg BID for BP management \par \par Afib: \par - Stable \par - Continue oral anticoagulation with Warfarin. Pt.'s current regimen is 5 mg 4 times/week and 2.5 mg on the other days \par - INR drawn 2.08 continue current dosage\par \par DM2:\par - Controlled\par - Last A1c 6.0%\par - Continue glycemic control with Metformin 1000 mg BID\par \par Mitral regurgitation: \par - Stable - pt. is currently asymptomatic \par - Last echo (6/2022) revealed mild-mod MR, min AR, mildly dilated left atrium, mild LVH and normal LVSF with EF 56%\par - Will repeat at follow-up for surveillance of MR\par \par Pt. to RTC in one month for INR check

## 2023-07-11 ENCOUNTER — APPOINTMENT (OUTPATIENT)
Dept: HEART AND VASCULAR | Facility: CLINIC | Age: 88
End: 2023-07-11
Payer: MEDICARE

## 2023-07-11 ENCOUNTER — NON-APPOINTMENT (OUTPATIENT)
Age: 88
End: 2023-07-11

## 2023-07-11 VITALS
HEIGHT: 60 IN | BODY MASS INDEX: 28.86 KG/M2 | TEMPERATURE: 98.2 F | DIASTOLIC BLOOD PRESSURE: 69 MMHG | OXYGEN SATURATION: 97 % | WEIGHT: 147 LBS | HEART RATE: 63 BPM | SYSTOLIC BLOOD PRESSURE: 135 MMHG

## 2023-07-11 VITALS — DIASTOLIC BLOOD PRESSURE: 74 MMHG | SYSTOLIC BLOOD PRESSURE: 135 MMHG

## 2023-07-11 PROCEDURE — 99214 OFFICE O/P EST MOD 30 MIN: CPT | Mod: 25

## 2023-07-11 PROCEDURE — 96372 THER/PROPH/DIAG INJ SC/IM: CPT

## 2023-07-11 RX ORDER — CYANOCOBALAMIN 1000 UG/ML
1000 INJECTION INTRAMUSCULAR; SUBCUTANEOUS
Qty: 0 | Refills: 0 | Status: COMPLETED | OUTPATIENT
Start: 2023-07-11

## 2023-07-11 RX ADMIN — CYANOCOBALAMIN 0 MCG/ML: 1000 INJECTION INTRAMUSCULAR; SUBCUTANEOUS at 00:00

## 2023-07-12 LAB
ALBUMIN SERPL ELPH-MCNC: 4.3 G/DL
ALP BLD-CCNC: 112 U/L
ALT SERPL-CCNC: 11 U/L
ANION GAP SERPL CALC-SCNC: 15 MMOL/L
AST SERPL-CCNC: 22 U/L
BILIRUB SERPL-MCNC: 0.3 MG/DL
BUN SERPL-MCNC: 9 MG/DL
CALCIUM SERPL-MCNC: 9.3 MG/DL
CHLORIDE SERPL-SCNC: 105 MMOL/L
CHOLEST SERPL-MCNC: 148 MG/DL
CK SERPL-CCNC: 52 U/L
CO2 SERPL-SCNC: 24 MMOL/L
CREAT SERPL-MCNC: 0.91 MG/DL
EGFR: 61 ML/MIN/1.73M2
ESTIMATED AVERAGE GLUCOSE: 126 MG/DL
FERRITIN SERPL-MCNC: 19 NG/ML
GLUCOSE SERPL-MCNC: 119 MG/DL
HBA1C MFR BLD HPLC: 6 %
HDLC SERPL-MCNC: 42 MG/DL
INR PPP: 2.24 RATIO
LDLC SERPL CALC-MCNC: 85 MG/DL
NONHDLC SERPL-MCNC: 105 MG/DL
POTASSIUM SERPL-SCNC: 3.6 MMOL/L
PROT SERPL-MCNC: 7.2 G/DL
PT BLD: 26.7 SEC
SODIUM SERPL-SCNC: 143 MMOL/L
TRIGL SERPL-MCNC: 111 MG/DL
TSH SERPL-ACNC: 1.43 UIU/ML
VIT B12 SERPL-MCNC: 658 PG/ML

## 2023-07-12 NOTE — ASSESSMENT
[FreeTextEntry1] : Pt. is a 88 year old F with PMHx of CAD (s/p PCI x2 2011), Afib (on Warfarin), HTN, HLD and DM2 who presents today for follow-up.\par \par CAD:\par - Stable - no reports of angina or dyspnea on exertion\par \par HTN:\par - Well-controlled\par - Continue Amlodipine 10 mg daily and Atenolol 50 mg BID for BP management \par \par Afib: \par - Stable \par - Continue oral anticoagulation with Warfarin. Pt.'s current regimen is 5 mg 4 times/week and 2.5 mg on the other days \par - INR drawn 2.24continue current dosage\par \par DM2:\par - Controlled\par - Last A1c 6.0%\par - Continue glycemic control with Metformin 1000 mg BID\par \par Mitral regurgitation: \par - Stable - pt. is currently asymptomatic \par - Last echo (6/2022) revealed mild-mod MR, min AR, mildly dilated left atrium, mild LVH and normal LVSF with EF 56%\par - Will repeat at follow-up for surveillance of MR\par \par Pt. to RTC in one month for INR check

## 2023-07-12 NOTE — REASON FOR VISIT
[Arrhythmia/ECG Abnorrmalities] : arrhythmia/ECG abnormalities [FreeTextEntry1] : Pt. is a 88 year old F with PMHx of CAD (s/p PCI x2 2011), Afib (on Warfarin), HTN, HLD and DM2 who presents today for follow-up.

## 2023-08-22 ENCOUNTER — APPOINTMENT (OUTPATIENT)
Dept: HEART AND VASCULAR | Facility: CLINIC | Age: 88
End: 2023-08-22
Payer: MEDICARE

## 2023-08-22 VITALS
HEIGHT: 61 IN | HEART RATE: 67 BPM | TEMPERATURE: 94 F | DIASTOLIC BLOOD PRESSURE: 74 MMHG | WEIGHT: 143 LBS | OXYGEN SATURATION: 97 % | SYSTOLIC BLOOD PRESSURE: 149 MMHG | BODY MASS INDEX: 27 KG/M2

## 2023-08-22 PROCEDURE — 99214 OFFICE O/P EST MOD 30 MIN: CPT | Mod: 25

## 2023-08-22 PROCEDURE — 36415 COLL VENOUS BLD VENIPUNCTURE: CPT

## 2023-08-22 PROCEDURE — 96372 THER/PROPH/DIAG INJ SC/IM: CPT

## 2023-08-22 RX ORDER — CYANOCOBALAMIN 1000 UG/ML
1000 INJECTION INTRAMUSCULAR; SUBCUTANEOUS
Qty: 0 | Refills: 0 | Status: COMPLETED | OUTPATIENT
Start: 2023-08-22

## 2023-08-22 RX ADMIN — CYANOCOBALAMIN 0 MCG/ML: 1000 INJECTION INTRAMUSCULAR; SUBCUTANEOUS at 00:00

## 2023-08-22 RX ADMIN — CYANOCOBALAMINE 0 MCG/ML: 1000 INJECTION INTRAMUSCULAR; SUBCUTANEOUS at 00:00

## 2023-08-23 LAB
INR PPP: 2.47 RATIO
PT BLD: 27.2 SEC

## 2023-08-23 NOTE — ASSESSMENT
[FreeTextEntry1] : Pt. is a 88 year old F with PMHx of CAD (s/p PCI x2 2011), Afib (on Warfarin), HTN, HLD and DM2 who presents today for follow-up.  CAD: - Stable - no reports of angina or dyspnea on exertion  HTN: - Well-controlled - Continue Amlodipine 10 mg daily and Atenolol 50 mg BID for BP management   Afib:  - Stable  - Continue oral anticoagulation with Warfarin. Pt.'s current regimen is 5 mg 4 times/week and 2.5 mg on the other days  - INR drawn 2.46continue current dosage  DM2: - Controlled - Last A1c 6.0% - Continue glycemic control with Metformin 1000 mg BID  Mitral regurgitation:  - Stable - pt. is currently asymptomatic  - Last echo (6/2022) revealed mild-mod MR, min AR, mildly dilated left atrium, mild LVH and normal LVSF with EF 56% - Will repeat at follow-up for surveillance of MR Weight loss cmp tfs cbc unremakable   Pt. to RTC in one month for INR check

## 2023-08-23 NOTE — HISTORY OF PRESENT ILLNESS
[FreeTextEntry1] : Since her last visit, pt. is doing well overall.   She remains active walking regularly, up to 12 blocks at a time. No exertional chest pain or shortness of breath.  She is taking Warfarin 5 mg x 4 days/week and 2.5 mg on the other days. No GI/ bleeding.    Pt. also denies any orthopnea, PND, palpitations, lightheadedness or syncope. She reports chronic lower extremity edema. She has not tried compression socks.  pt feels that she is loosing weight use to weigh  158 today 148  ================================== Labs/Diagnostics:  2023:  Lipid profile: T, TC: 172, HDL: 50, LDL: 102 A1c: 6%  ECHO 2022 Mild - Mod MR, Min AR, mildly dilated left atrium, mild LVH, EF 56%, normal wall motion

## 2023-09-14 ENCOUNTER — APPOINTMENT (OUTPATIENT)
Dept: GASTROENTEROLOGY | Facility: CLINIC | Age: 88
End: 2023-09-14

## 2023-09-14 ENCOUNTER — NON-APPOINTMENT (OUTPATIENT)
Age: 88
End: 2023-09-14

## 2023-09-19 ENCOUNTER — OUTPATIENT (OUTPATIENT)
Dept: OUTPATIENT SERVICES | Facility: HOSPITAL | Age: 88
LOS: 1 days | End: 2023-09-19
Payer: MEDICARE

## 2023-09-19 ENCOUNTER — RESULT REVIEW (OUTPATIENT)
Age: 88
End: 2023-09-19

## 2023-09-19 DIAGNOSIS — I48.20 CHRONIC ATRIAL FIBRILLATION, UNSPECIFIED: ICD-10-CM

## 2023-09-19 DIAGNOSIS — Z95.5 PRESENCE OF CORONARY ANGIOPLASTY IMPLANT AND GRAFT: Chronic | ICD-10-CM

## 2023-09-19 LAB — GLUCOSE BLDC GLUCOMTR-MCNC: 94 MG/DL — SIGNIFICANT CHANGE UP (ref 70–99)

## 2023-09-19 PROCEDURE — 93018 CV STRESS TEST I&R ONLY: CPT

## 2023-09-19 PROCEDURE — A9500: CPT

## 2023-09-19 PROCEDURE — 93016 CV STRESS TEST SUPVJ ONLY: CPT

## 2023-09-19 PROCEDURE — 78452 HT MUSCLE IMAGE SPECT MULT: CPT

## 2023-09-19 PROCEDURE — 93017 CV STRESS TEST TRACING ONLY: CPT

## 2023-09-19 PROCEDURE — 78452 HT MUSCLE IMAGE SPECT MULT: CPT | Mod: 26,MH

## 2023-09-19 PROCEDURE — 82962 GLUCOSE BLOOD TEST: CPT

## 2023-09-28 ENCOUNTER — APPOINTMENT (OUTPATIENT)
Dept: CT IMAGING | Facility: HOSPITAL | Age: 88
End: 2023-09-28

## 2023-09-28 ENCOUNTER — OUTPATIENT (OUTPATIENT)
Dept: OUTPATIENT SERVICES | Facility: HOSPITAL | Age: 88
LOS: 1 days | End: 2023-09-28
Payer: MEDICARE

## 2023-09-28 DIAGNOSIS — Z95.5 PRESENCE OF CORONARY ANGIOPLASTY IMPLANT AND GRAFT: Chronic | ICD-10-CM

## 2023-09-28 PROCEDURE — 75574 CT ANGIO HRT W/3D IMAGE: CPT

## 2023-09-28 PROCEDURE — 75574 CT ANGIO HRT W/3D IMAGE: CPT | Mod: 26,MH

## 2023-09-29 ENCOUNTER — RESULT REVIEW (OUTPATIENT)
Age: 88
End: 2023-09-29

## 2023-09-29 ENCOUNTER — OUTPATIENT (OUTPATIENT)
Dept: OUTPATIENT SERVICES | Facility: HOSPITAL | Age: 88
LOS: 1 days | End: 2023-09-29
Payer: MEDICARE

## 2023-09-29 DIAGNOSIS — Z95.5 PRESENCE OF CORONARY ANGIOPLASTY IMPLANT AND GRAFT: Chronic | ICD-10-CM

## 2023-09-29 PROCEDURE — 0502T: CPT

## 2023-09-29 PROCEDURE — 0504T: CPT

## 2023-09-29 PROCEDURE — 0503T: CPT

## 2023-10-10 ENCOUNTER — APPOINTMENT (OUTPATIENT)
Dept: HEART AND VASCULAR | Facility: CLINIC | Age: 88
End: 2023-10-10
Payer: MEDICARE

## 2023-10-10 PROCEDURE — 99214 OFFICE O/P EST MOD 30 MIN: CPT | Mod: 25

## 2023-10-10 PROCEDURE — 96372 THER/PROPH/DIAG INJ SC/IM: CPT

## 2023-10-10 PROCEDURE — 36415 COLL VENOUS BLD VENIPUNCTURE: CPT

## 2023-10-10 RX ORDER — CYANOCOBALAMIN 1000 UG/ML
1000 INJECTION INTRAMUSCULAR; SUBCUTANEOUS
Qty: 0 | Refills: 0 | Status: COMPLETED | OUTPATIENT
Start: 2023-10-10

## 2023-10-10 RX ADMIN — CYANOCOBALAMINE 0 MCG/ML: 1000 INJECTION INTRAMUSCULAR; SUBCUTANEOUS at 00:00

## 2023-10-10 RX ADMIN — CYANOCOBALAMIN 0 MCG/ML: 1000 INJECTION INTRAMUSCULAR; SUBCUTANEOUS at 00:00

## 2023-10-11 LAB
INR PPP: 2.39 RATIO
PT BLD: 26.3 SEC

## 2023-10-17 RX ORDER — ATENOLOL 50 MG/1
50 TABLET ORAL
Qty: 180 | Refills: 3 | Status: ACTIVE | COMMUNITY
Start: 1900-01-01 | End: 1900-01-01

## 2023-10-17 RX ORDER — ROSUVASTATIN CALCIUM 5 MG/1
5 TABLET, FILM COATED ORAL
Qty: 90 | Refills: 0 | Status: ACTIVE | COMMUNITY
Start: 2023-10-10 | End: 1900-01-01

## 2023-10-17 RX ORDER — AMLODIPINE BESYLATE 10 MG/1
10 TABLET ORAL
Qty: 90 | Refills: 3 | Status: ACTIVE | COMMUNITY
Start: 1900-01-01 | End: 1900-01-01

## 2023-10-17 RX ORDER — METFORMIN HYDROCHLORIDE 1000 MG/1
1000 TABLET, COATED ORAL
Qty: 180 | Refills: 3 | Status: ACTIVE | COMMUNITY
Start: 1900-01-01 | End: 1900-01-01

## 2023-10-18 ENCOUNTER — APPOINTMENT (OUTPATIENT)
Dept: HEART AND VASCULAR | Facility: CLINIC | Age: 88
End: 2023-10-18
Payer: MEDICARE

## 2023-10-18 VITALS
HEART RATE: 69 BPM | WEIGHT: 143 LBS | TEMPERATURE: 97.9 F | HEIGHT: 61 IN | SYSTOLIC BLOOD PRESSURE: 130 MMHG | BODY MASS INDEX: 27 KG/M2 | OXYGEN SATURATION: 98 % | DIASTOLIC BLOOD PRESSURE: 70 MMHG

## 2023-10-18 PROCEDURE — 99214 OFFICE O/P EST MOD 30 MIN: CPT

## 2023-11-21 ENCOUNTER — APPOINTMENT (OUTPATIENT)
Dept: HEART AND VASCULAR | Facility: CLINIC | Age: 88
End: 2023-11-21
Payer: MEDICARE

## 2023-11-21 VITALS
SYSTOLIC BLOOD PRESSURE: 142 MMHG | HEART RATE: 51 BPM | DIASTOLIC BLOOD PRESSURE: 69 MMHG | TEMPERATURE: 97.8 F | HEIGHT: 61 IN | BODY MASS INDEX: 27 KG/M2 | WEIGHT: 143 LBS | OXYGEN SATURATION: 97 %

## 2023-11-21 DIAGNOSIS — E53.8 DEFICIENCY OF OTHER SPECIFIED B GROUP VITAMINS: ICD-10-CM

## 2023-11-21 DIAGNOSIS — I25.10 ATHEROSCLEROTIC HEART DISEASE OF NATIVE CORONARY ARTERY W/OUT ANGINA PECTORIS: ICD-10-CM

## 2023-11-21 PROCEDURE — 93000 ELECTROCARDIOGRAM COMPLETE: CPT

## 2023-11-21 PROCEDURE — 36415 COLL VENOUS BLD VENIPUNCTURE: CPT

## 2023-11-21 PROCEDURE — 99214 OFFICE O/P EST MOD 30 MIN: CPT

## 2023-11-28 LAB
ALBUMIN SERPL ELPH-MCNC: 4.5 G/DL
ALP BLD-CCNC: 114 U/L
ALT SERPL-CCNC: 11 U/L
ANION GAP SERPL CALC-SCNC: 12 MMOL/L
APTT BLD: 49.3 SEC
AST SERPL-CCNC: 18 U/L
BASOPHILS # BLD AUTO: 0.02 K/UL
BASOPHILS NFR BLD AUTO: 0.3 %
BILIRUB SERPL-MCNC: 0.3 MG/DL
BUN SERPL-MCNC: 11 MG/DL
CALCIUM SERPL-MCNC: 9.7 MG/DL
CHLORIDE SERPL-SCNC: 105 MMOL/L
CHOLEST SERPL-MCNC: 159 MG/DL
CK SERPL-CCNC: 68 U/L
CO2 SERPL-SCNC: 29 MMOL/L
CREAT SERPL-MCNC: 0.88 MG/DL
EGFR: 64 ML/MIN/1.73M2
EOSINOPHIL # BLD AUTO: 0.12 K/UL
EOSINOPHIL NFR BLD AUTO: 1.8 %
ESTIMATED AVERAGE GLUCOSE: 120 MG/DL
FERRITIN SERPL-MCNC: 16 NG/ML
GLUCOSE SERPL-MCNC: 119 MG/DL
HBA1C MFR BLD HPLC: 5.8 %
HCT VFR BLD CALC: 38.3 %
HDLC SERPL-MCNC: 45 MG/DL
HGB BLD-MCNC: 11.6 G/DL
IMM GRANULOCYTES NFR BLD AUTO: 0.3 %
INR PPP: 2.36 RATIO
LDLC SERPL CALC-MCNC: 87 MG/DL
LYMPHOCYTES # BLD AUTO: 1.68 K/UL
LYMPHOCYTES NFR BLD AUTO: 25.2 %
MAN DIFF?: NORMAL
MCHC RBC-ENTMCNC: 28.6 PG
MCHC RBC-ENTMCNC: 30.3 GM/DL
MCV RBC AUTO: 94.3 FL
MONOCYTES # BLD AUTO: 0.54 K/UL
MONOCYTES NFR BLD AUTO: 8.1 %
NEUTROPHILS # BLD AUTO: 4.29 K/UL
NEUTROPHILS NFR BLD AUTO: 64.3 %
NONHDLC SERPL-MCNC: 114 MG/DL
PLATELET # BLD AUTO: 382 K/UL
POTASSIUM SERPL-SCNC: 3.9 MMOL/L
PROT SERPL-MCNC: 7.2 G/DL
PT BLD: 26 SEC
RBC # BLD: 4.06 M/UL
RBC # FLD: 14.5 %
SODIUM SERPL-SCNC: 146 MMOL/L
TRIGL SERPL-MCNC: 159 MG/DL
TSH SERPL-ACNC: 1.39 UIU/ML
VIT B12 SERPL-MCNC: 631 PG/ML
WBC # FLD AUTO: 6.67 K/UL

## 2024-01-02 ENCOUNTER — APPOINTMENT (OUTPATIENT)
Dept: HEART AND VASCULAR | Facility: CLINIC | Age: 89
End: 2024-01-02
Payer: MEDICARE

## 2024-01-02 VITALS
OXYGEN SATURATION: 98 % | HEIGHT: 60 IN | BODY MASS INDEX: 26.5 KG/M2 | SYSTOLIC BLOOD PRESSURE: 150 MMHG | DIASTOLIC BLOOD PRESSURE: 76 MMHG | TEMPERATURE: 97.6 F | HEART RATE: 70 BPM | WEIGHT: 135 LBS

## 2024-01-02 DIAGNOSIS — E11.9 TYPE 2 DIABETES MELLITUS W/OUT COMPLICATIONS: ICD-10-CM

## 2024-01-02 DIAGNOSIS — I10 ESSENTIAL (PRIMARY) HYPERTENSION: ICD-10-CM

## 2024-01-02 DIAGNOSIS — I34.0 NONRHEUMATIC MITRAL (VALVE) INSUFFICIENCY: ICD-10-CM

## 2024-01-02 PROCEDURE — 36415 COLL VENOUS BLD VENIPUNCTURE: CPT

## 2024-01-02 PROCEDURE — 99213 OFFICE O/P EST LOW 20 MIN: CPT

## 2024-01-02 PROCEDURE — 93000 ELECTROCARDIOGRAM COMPLETE: CPT

## 2024-01-02 RX ORDER — CYANOCOBALAMIN 1000 UG/ML
1000 INJECTION INTRAMUSCULAR; SUBCUTANEOUS
Qty: 0 | Refills: 0 | Status: COMPLETED | OUTPATIENT
Start: 2024-01-02

## 2024-01-02 RX ADMIN — CYANOCOBALAMIN 0 MCG/ML: 1000 INJECTION INTRAMUSCULAR; SUBCUTANEOUS at 00:00

## 2024-01-02 NOTE — HISTORY OF PRESENT ILLNESS
[FreeTextEntry1] : Since her last visit, pt. is doing well overall. pt denies chest pain or dyspnea  She remains active walking regularly, up to 12 blocks at a time. No exertional chest pain or shortness of breath.  She is taking Warfarin 5 mg x 4 days/week and 2.5 mg on the other days. No GI/ bleeding.    Pt. also denies any orthopnea, PND, palpitations, lightheadedness or syncope. She reports chronic lower extremity edema. She has not tried compression socks.  pt feels that she is loosing weight use to weigh  158 today 148  ================================== Labs/Diagnostics:  2023:  Lipid profile: T, TC: 172, HDL: 50, LDL: 102 A1c: 6%  ECHO 2022 Mild - Mod MR, Min AR, mildly dilated left atrium, mild LVH, EF 56%, normal wall motion

## 2024-01-02 NOTE — ASSESSMENT
[FreeTextEntry1] : Pt has severe three vessel diseasebut is not symptomatic pt does not want to consider an intervention at this time Pt will be started on Rosuvastatin paf check inr last therapeutic dm last hgba1c 6.0 Dr Jack consult appreciated pt with leg pain referred to ortho

## 2024-01-03 LAB
ALBUMIN SERPL ELPH-MCNC: 4 G/DL
ALP BLD-CCNC: 129 U/L
ALT SERPL-CCNC: 25 U/L
ANION GAP SERPL CALC-SCNC: 13 MMOL/L
AST SERPL-CCNC: 23 U/L
BASOPHILS # BLD AUTO: 0.02 K/UL
BASOPHILS NFR BLD AUTO: 0.3 %
BILIRUB SERPL-MCNC: 0.4 MG/DL
BUN SERPL-MCNC: 9 MG/DL
CALCIUM SERPL-MCNC: 9.3 MG/DL
CHLORIDE SERPL-SCNC: 104 MMOL/L
CHOLEST SERPL-MCNC: 150 MG/DL
CO2 SERPL-SCNC: 28 MMOL/L
CREAT SERPL-MCNC: 0.78 MG/DL
EGFR: 73 ML/MIN/1.73M2
EOSINOPHIL # BLD AUTO: 0.09 K/UL
EOSINOPHIL NFR BLD AUTO: 1.3 %
GLUCOSE SERPL-MCNC: 127 MG/DL
HCT VFR BLD CALC: 34.7 %
HDLC SERPL-MCNC: 43 MG/DL
HGB BLD-MCNC: 10.5 G/DL
IMM GRANULOCYTES NFR BLD AUTO: 0.3 %
INR PPP: 2.13 RATIO
LDLC SERPL CALC-MCNC: 88 MG/DL
LYMPHOCYTES # BLD AUTO: 1.73 K/UL
LYMPHOCYTES NFR BLD AUTO: 25.3 %
MAN DIFF?: NORMAL
MCHC RBC-ENTMCNC: 27.1 PG
MCHC RBC-ENTMCNC: 30.3 GM/DL
MCV RBC AUTO: 89.7 FL
MONOCYTES # BLD AUTO: 0.59 K/UL
MONOCYTES NFR BLD AUTO: 8.6 %
NEUTROPHILS # BLD AUTO: 4.4 K/UL
NEUTROPHILS NFR BLD AUTO: 64.2 %
NONHDLC SERPL-MCNC: 108 MG/DL
PLATELET # BLD AUTO: 355 K/UL
POTASSIUM SERPL-SCNC: 3.8 MMOL/L
PROT SERPL-MCNC: 7.1 G/DL
PT BLD: 23.5 SEC
RBC # BLD: 3.87 M/UL
RBC # FLD: 14.9 %
SODIUM SERPL-SCNC: 145 MMOL/L
TRIGL SERPL-MCNC: 108 MG/DL
WBC # FLD AUTO: 6.85 K/UL

## 2024-02-09 NOTE — ASSESSMENT
[FreeTextEntry1] : Pt. is an 88-year-old F with PMHx of CAD (s/p PCI x2 2011 - medically managed), Afib (on Warfarin), HTN, HLD and DM2 who presents today for follow-up.

## 2024-02-09 NOTE — HISTORY OF PRESENT ILLNESS
[FreeTextEntry1] : Since her last visit,   Activity:   Pt. denies any chest pain, dyspnea, orthopnea, PND, palpitations, lightheadedness, syncope or lower extremity edema.   ================================== Labs/Diagnostics:  2024 Lipid profile: T, TC: 150, HDL: 43, LDL: 88 K/Creat: 3.8/0.78 A1c: 5.8%  CCTA (2023): Cardiac: 1.  Patent stent noted in proximal RCA. 2.  Severe stenosis of proximal LAD, mid LAD and ramus intermedius --> FFR positive 3.  Moderate stenosis in RPDA 4.  Calcific plaque obscures the lumen of RPDA, D1, and OM1 which precludes stenosis severity assessment. 5.  Remaining segments demonstrate non-obstructive coronary artery disease. 6.  No evidence of left atrial appendage thrombus.  Pharm NST (2023): MPI is abnormal; small area of mild ischemia in apex; evidence of ischemic dilatation of LV; normal stress EKG.   ECHO (10/2023): EF 53%, mild-moderate MR  ECHO (2022): mild-moderate MR, minimal AR, mildly dilated left atrium, mild LVH, EF 56%, normal wall motion.

## 2024-02-09 NOTE — REASON FOR VISIT
[Arrhythmia/ECG Abnorrmalities] : arrhythmia/ECG abnormalities [FreeTextEntry1] : Pt. is an 88-year-old F with PMHx of CAD (s/p PCI x2 2011 - medically managed), Afib (on Warfarin), HTN, HLD and DM2 who presents today for follow-up.

## 2024-02-21 ENCOUNTER — APPOINTMENT (OUTPATIENT)
Dept: HEART AND VASCULAR | Facility: CLINIC | Age: 89
End: 2024-02-21
Payer: MEDICARE

## 2024-02-21 VITALS
SYSTOLIC BLOOD PRESSURE: 122 MMHG | HEIGHT: 60 IN | WEIGHT: 135 LBS | TEMPERATURE: 97.8 F | BODY MASS INDEX: 26.5 KG/M2 | OXYGEN SATURATION: 99 % | HEART RATE: 67 BPM | DIASTOLIC BLOOD PRESSURE: 62 MMHG

## 2024-02-21 PROCEDURE — 36415 COLL VENOUS BLD VENIPUNCTURE: CPT

## 2024-02-21 PROCEDURE — G2211 COMPLEX E/M VISIT ADD ON: CPT

## 2024-02-21 PROCEDURE — 99214 OFFICE O/P EST MOD 30 MIN: CPT

## 2024-02-21 RX ADMIN — CYANOCOBALAMIN 0 MCG/ML: 1000 INJECTION INTRAMUSCULAR; SUBCUTANEOUS at 00:00

## 2024-02-22 LAB
INR PPP: 1.95 RATIO
PT BLD: 21.6 SEC

## 2024-03-26 ENCOUNTER — NON-APPOINTMENT (OUTPATIENT)
Age: 89
End: 2024-03-26

## 2024-03-26 ENCOUNTER — APPOINTMENT (OUTPATIENT)
Dept: HEART AND VASCULAR | Facility: CLINIC | Age: 89
End: 2024-03-26
Payer: MEDICARE

## 2024-03-26 VITALS
SYSTOLIC BLOOD PRESSURE: 152 MMHG | HEIGHT: 60 IN | WEIGHT: 135 LBS | HEART RATE: 73 BPM | TEMPERATURE: 97.6 F | BODY MASS INDEX: 26.5 KG/M2 | OXYGEN SATURATION: 97 % | DIASTOLIC BLOOD PRESSURE: 66 MMHG

## 2024-03-26 VITALS — SYSTOLIC BLOOD PRESSURE: 138 MMHG | DIASTOLIC BLOOD PRESSURE: 62 MMHG

## 2024-03-26 PROCEDURE — G2211 COMPLEX E/M VISIT ADD ON: CPT

## 2024-03-26 PROCEDURE — 36415 COLL VENOUS BLD VENIPUNCTURE: CPT

## 2024-03-26 PROCEDURE — 99214 OFFICE O/P EST MOD 30 MIN: CPT

## 2024-03-26 RX ORDER — CYANOCOBALAMIN 1000 UG/ML
1000 INJECTION INTRAMUSCULAR; SUBCUTANEOUS
Qty: 1 | Refills: 0 | Status: COMPLETED | COMMUNITY
Start: 2023-08-22 | End: 2024-03-26

## 2024-03-27 NOTE — DISCUSSION/SUMMARY
[FreeTextEntry1] : Pt. is an 88-year-old F with PMHx of CAD (s/p PCI x2 2011 - medically managed), Afib (on Warfarin), HTN, HLD and DM2 who presents today for follow-up.   AFIB: EKG AFIB 74 bpm, Asymptomatic. CW Atenolol 50mg BID and Warfarin with INR goal 2 - 3.0. INR drawn today. Currently taking 5mg twice a week with rest at 2.5mg. Adjust as needed.  CAD: Active without chest pains or dyspnea. EKG today as above, no change. Given the significance of her last CCTA she will restart the Crestor. She still defers the cardiac cath.  HTN: Blood pressure slightly elevated, acceptable. Continue with Amlodipine 10mg HLD: She will restart Crestor. LDL goal < 70 DM: Check A1c with goal <7.0. She denies any claudication's  Follow up in 6 weeks with repeat INR.  I, Dr. Nixon, personally performed the evaluation and management (E/M) services for this established patient who presents today with (a) new problem(s)/exacerbation of (an) existing condition(s). That E/M includes conducting the clinically appropriate interval history &/or exam, assessing all new/exacerbated conditions, and establishing a new plan of care. Today, my ACP, Sophia Hall, was here to observe &/or participate in the visit & follow plan of care established by me.

## 2024-03-27 NOTE — PHYSICAL EXAM
[Normal Conjunctiva] : normal conjunctiva [Normal Venous Pressure] : normal venous pressure [No Carotid Bruit] : no carotid bruit [Normal S1, S2] : normal S1, S2 [No Murmur] : no murmur [Normal Gait] : normal gait [No Edema] : no edema [Normal] : alert and oriented, normal memory [de-identified] : 1+ edema, bilaterally

## 2024-03-27 NOTE — REVIEW OF SYSTEMS
[Lower Ext Edema] : lower extremity edema [Fever] : no fever [Chills] : no chills [Dyspnea on exertion] : not dyspnea during exertion [Chest Discomfort] : no chest discomfort [Palpitations] : no palpitations [Orthopnea] : no orthopnea [PND] : no PND [Cough] : no cough [Nausea] : no nausea [Vomiting] : no vomiting [Diarrhea] : diarrhea [Dizziness] : no dizziness [Numbness (Hypoesthesia)] : no numbness [Weakness] : no weakness [Speech Disturbance] : no speech disturbance [Easy Bleeding] : no tendency for easy bleeding

## 2024-03-27 NOTE — HISTORY OF PRESENT ILLNESS
[FreeTextEntry1] : Since her last visit, she overall is feeling well. She has no acute concerns.   She does admit she never restarted the rosuvastatin.   She remains active by walking 4 times a week. She can walk up to 10 blocks with her rolling cart without complications. She has no chest pains or dyspnea on exertion.   She has baseline edema but no orthopnea or PND  Patient denies any palpitations, shortness of breath at rest, dizziness, falls, syncope or neuro focal deficits.  No recent illnesses. No bleeding complications.   ================================== Labs/Diagnostics:  2024 Lipid profile: T, TC: 150, HDL: 43, LDL: 88 K/Creat: 3.8/0.78 A1c: 5.8%  CCTA (2023): Cardiac: 1.  Patent stent noted in proximal RCA. 2.  Severe stenosis of proximal LAD, mid LAD and ramus intermedius --> FFR positive 3.  Moderate stenosis in RPDA 4.  Calcific plaque obscures the lumen of RPDA, D1, and OM1 which precludes stenosis severity assessment. 5.  Remaining segments demonstrate non-obstructive coronary artery disease. 6.  No evidence of left atrial appendage thrombus.  Pharm NST (2023): MPI is abnormal; small area of mild ischemia in apex; evidence of ischemic dilatation of LV; normal stress EKG.   ECHO (10/2023): EF 53%, mild-moderate MR  ECHO (2022): mild-moderate MR, minimal AR, mildly dilated left atrium, mild LVH, EF 56%, normal wall motion.

## 2024-04-01 ENCOUNTER — LABORATORY RESULT (OUTPATIENT)
Age: 89
End: 2024-04-01

## 2024-05-14 ENCOUNTER — APPOINTMENT (OUTPATIENT)
Dept: HEART AND VASCULAR | Facility: CLINIC | Age: 89
End: 2024-05-14
Payer: MEDICARE

## 2024-05-14 VITALS
HEIGHT: 60 IN | OXYGEN SATURATION: 96 % | BODY MASS INDEX: 26.5 KG/M2 | WEIGHT: 135 LBS | HEART RATE: 55 BPM | SYSTOLIC BLOOD PRESSURE: 153 MMHG | DIASTOLIC BLOOD PRESSURE: 86 MMHG

## 2024-05-14 DIAGNOSIS — I48.20 CHRONIC ATRIAL FIBRILLATION, UNSP: ICD-10-CM

## 2024-05-14 PROCEDURE — 99214 OFFICE O/P EST MOD 30 MIN: CPT | Mod: 25

## 2024-05-14 PROCEDURE — 96372 THER/PROPH/DIAG INJ SC/IM: CPT

## 2024-05-14 PROCEDURE — 36415 COLL VENOUS BLD VENIPUNCTURE: CPT

## 2024-05-14 RX ORDER — CYANOCOBALAMIN 1000 UG/ML
1000 INJECTION INTRAMUSCULAR; SUBCUTANEOUS
Qty: 1 | Refills: 0 | Status: ACTIVE | COMMUNITY
Start: 2024-05-14 | End: 1900-01-01

## 2024-05-14 RX ORDER — CYANOCOBALAMIN 1000 UG/ML
1000 INJECTION INTRAMUSCULAR; SUBCUTANEOUS
Qty: 0 | Refills: 0 | Status: COMPLETED | OUTPATIENT
Start: 2024-05-14

## 2024-05-14 RX ADMIN — CYANOCOBALAMINE 0 MCG/ML: 1000 INJECTION INTRAMUSCULAR; SUBCUTANEOUS at 00:00

## 2024-05-14 NOTE — ASSESSMENT
[FreeTextEntry1] : Pt. is an 89-year-old F with PMHx of CAD (s/p PCI x2 2011 - medically managed), Afib (on Warfarin), HTN, HLD and DM2 who presents today for follow-up.  paf check inr last 2.5 dm last hgba1c 5.9 on metformen

## 2024-05-14 NOTE — REASON FOR VISIT
[Arrhythmia/ECG Abnorrmalities] : arrhythmia/ECG abnormalities [FreeTextEntry1] : Pt. is an 89-year-old F with PMHx of CAD (s/p PCI x2 2011 - medically managed), Afib (on Warfarin), HTN, HLD and DM2 who presents today for follow-up.

## 2024-05-14 NOTE — HISTORY OF PRESENT ILLNESS
[FreeTextEntry1] : Since her last visit, patient is doing well with no new chest pain shortness of breath or ankle edema  Activity:   Pt. denies any chest pain, dyspnea, orthopnea, PND, palpitations, lightheadedness, syncope or lower extremity edema.   ================================== Labs/Diagnostics:  2024 K/Creat: 4.1/0.87 A1c: 5.9%  2024 Lipid profile: T, TC: 150, HDL: 43, LDL: 88  CCTA (2023): Cardiac: 1.  Patent stent noted in proximal RCA. 2.  Severe stenosis of proximal LAD, mid LAD and ramus intermedius --> FFR positive 3.  Moderate stenosis in RPDA 4.  Calcific plaque obscures the lumen of RPDA, D1, and OM1 which precludes stenosis severity assessment. 5.  Remaining segments demonstrate non-obstructive coronary artery disease. 6.  No evidence of left atrial appendage thrombus.  Pharm NST (2023): MPI is abnormal; small area of mild ischemia in apex; evidence of ischemic dilatation of LV; normal stress EKG.   ECHO (10/2023): EF 53%, mild-moderate MR  ECHO (2022): mild-moderate MR, minimal AR, mildly dilated left atrium, mild LVH, EF 56%, normal wall motion.

## 2024-05-14 NOTE — PHYSICAL EXAM
[Normal Conjunctiva] : normal conjunctiva [Normal Venous Pressure] : normal venous pressure [No Carotid Bruit] : no carotid bruit [Normal S1, S2] : normal S1, S2 [No Murmur] : no murmur [Normal Gait] : normal gait [Normal] : alert and oriented, normal memory [de-identified] : 1+ edema, bilaterally

## 2024-05-15 LAB
INR PPP: 2.56 RATIO
PT BLD: 28.2 SEC

## 2024-06-11 ENCOUNTER — APPOINTMENT (OUTPATIENT)
Dept: HEART AND VASCULAR | Facility: CLINIC | Age: 89
End: 2024-06-11
Payer: MEDICARE

## 2024-06-11 VITALS
HEIGHT: 60 IN | SYSTOLIC BLOOD PRESSURE: 161 MMHG | HEART RATE: 61 BPM | OXYGEN SATURATION: 98 % | BODY MASS INDEX: 25.91 KG/M2 | TEMPERATURE: 97.6 F | DIASTOLIC BLOOD PRESSURE: 77 MMHG | WEIGHT: 132 LBS

## 2024-06-11 PROCEDURE — 99214 OFFICE O/P EST MOD 30 MIN: CPT | Mod: 25

## 2024-06-11 PROCEDURE — 96372 THER/PROPH/DIAG INJ SC/IM: CPT

## 2024-06-11 PROCEDURE — 93000 ELECTROCARDIOGRAM COMPLETE: CPT

## 2024-06-11 PROCEDURE — 36415 COLL VENOUS BLD VENIPUNCTURE: CPT

## 2024-06-11 RX ORDER — CYANOCOBALAMIN 1000 UG/ML
1000 INJECTION INTRAMUSCULAR; SUBCUTANEOUS
Qty: 0 | Refills: 0 | Status: COMPLETED | OUTPATIENT
Start: 2024-06-11

## 2024-06-11 RX ADMIN — CYANOCOBALAMINE 0 MCG/ML: 1000 INJECTION INTRAMUSCULAR; SUBCUTANEOUS at 00:00

## 2024-06-12 LAB
ANION GAP SERPL CALC-SCNC: 14 MMOL/L
BUN SERPL-MCNC: 10 MG/DL
CALCIUM SERPL-MCNC: 9.1 MG/DL
CHLORIDE SERPL-SCNC: 105 MMOL/L
CO2 SERPL-SCNC: 24 MMOL/L
CREAT SERPL-MCNC: 0.69 MG/DL
EGFR: 83 ML/MIN/1.73M2
ESTIMATED AVERAGE GLUCOSE: 117 MG/DL
GLUCOSE SERPL-MCNC: 95 MG/DL
HBA1C MFR BLD HPLC: 5.7 %
HCT VFR BLD CALC: 34.4 %
HGB BLD-MCNC: 10.5 G/DL
INR PPP: 1.95 RATIO
POTASSIUM SERPL-SCNC: 3.7 MMOL/L
PT BLD: 21.6 SEC
SODIUM SERPL-SCNC: 143 MMOL/L
VIT B12 SERPL-MCNC: >2000 PG/ML

## 2024-06-12 NOTE — PHYSICAL EXAM
[Normal Conjunctiva] : normal conjunctiva [Normal Venous Pressure] : normal venous pressure [No Carotid Bruit] : no carotid bruit [Normal S1, S2] : normal S1, S2 [No Murmur] : no murmur [Normal Gait] : normal gait [Normal] : alert and oriented, normal memory [de-identified] : 1+ edema, bilaterally

## 2024-06-12 NOTE — HISTORY OF PRESENT ILLNESS
[FreeTextEntry1] : Since her last visit, patient has been down and [place] patient's feet have swollen up since plane ride patient denies any chest pain or shortness of breath  Activity:   Pt. denies any chest pain, dyspnea, orthopnea, PND, palpitations, lightheadedness, syncope or lower extremity edema.   ================================== Labs/Diagnostics:  2024 K/Creat: 4.1/0.87 A1c: 5.9%  2024 Lipid profile: T, TC: 150, HDL: 43, LDL: 88  CCTA (2023): Cardiac: 1.  Patent stent noted in proximal RCA. 2.  Severe stenosis of proximal LAD, mid LAD and ramus intermedius --> FFR positive 3.  Moderate stenosis in RPDA 4.  Calcific plaque obscures the lumen of RPDA, D1, and OM1 which precludes stenosis severity assessment. 5.  Remaining segments demonstrate non-obstructive coronary artery disease. 6.  No evidence of left atrial appendage thrombus.  Pharm NST (2023): MPI is abnormal; small area of mild ischemia in apex; evidence of ischemic dilatation of LV; normal stress EKG.   ECHO (10/2023): EF 53%, mild-moderate MR  ECHO (2022): mild-moderate MR, minimal AR, mildly dilated left atrium, mild LVH, EF 56%, normal wall motion.

## 2024-06-12 NOTE — ASSESSMENT
[FreeTextEntry1] : Pt. is an 89-year-old F with PMHx of CAD (s/p PCI x2 2011 - medically managed), Afib (on Warfarin), HTN, HLD and DM2 who presents today for follow-up.  Paroxysmal atrial fibrillation patient is on Coumadin 2.5 mg a day alternating with 5 mg every third day patient's INRs have been therapeutic the last on May 14 was 2.56  Coronary artery disease patient has severe three-vessel disease patient was seen by Dr. Jack patient would like to be treated medically patient has been asymptomatic  Patient has hypertension which is borderline controlled on amlodipine 10 mg a day and atenolol 50 mg a day  Patient is diabetic and is worried as she has lost her appetite will check hemoglobin A1c patient is on metformin 1000 mg twice a day  Hyperlipidemia patient is on rosuvastatin 5 mg a day pt is borderline anemic check iron next visit 7/2024 pt asymptomatic with severe 3 vessel disease  last mcv 94 2/2024

## 2024-06-24 ENCOUNTER — RX RENEWAL (OUTPATIENT)
Age: 89
End: 2024-06-24

## 2024-06-24 RX ORDER — WARFARIN 2.5 MG/1
2.5 TABLET ORAL
Qty: 90 | Refills: 2 | Status: ACTIVE | COMMUNITY
Start: 1900-01-01 | End: 1900-01-01

## 2024-07-11 ENCOUNTER — APPOINTMENT (OUTPATIENT)
Dept: HEART AND VASCULAR | Facility: CLINIC | Age: 89
End: 2024-07-11
Payer: MEDICARE

## 2024-07-11 VITALS
SYSTOLIC BLOOD PRESSURE: 157 MMHG | TEMPERATURE: 97.6 F | WEIGHT: 132 LBS | HEART RATE: 71 BPM | BODY MASS INDEX: 22.53 KG/M2 | DIASTOLIC BLOOD PRESSURE: 72 MMHG | HEIGHT: 64 IN | OXYGEN SATURATION: 97 %

## 2024-07-11 DIAGNOSIS — I25.10 ATHEROSCLEROTIC HEART DISEASE OF NATIVE CORONARY ARTERY W/OUT ANGINA PECTORIS: ICD-10-CM

## 2024-07-11 DIAGNOSIS — E11.9 TYPE 2 DIABETES MELLITUS W/OUT COMPLICATIONS: ICD-10-CM

## 2024-07-11 DIAGNOSIS — I48.20 CHRONIC ATRIAL FIBRILLATION, UNSP: ICD-10-CM

## 2024-07-11 PROCEDURE — 99214 OFFICE O/P EST MOD 30 MIN: CPT

## 2024-07-11 PROCEDURE — 36415 COLL VENOUS BLD VENIPUNCTURE: CPT

## 2024-07-11 PROCEDURE — 93000 ELECTROCARDIOGRAM COMPLETE: CPT

## 2024-07-11 PROCEDURE — G2211 COMPLEX E/M VISIT ADD ON: CPT

## 2024-07-11 RX ADMIN — CYANOCOBALAMIN 0 MCG/ML: 1000 INJECTION INTRAMUSCULAR; SUBCUTANEOUS at 00:00

## 2024-07-15 LAB
ALBUMIN SERPL ELPH-MCNC: 4.3 G/DL
ALP BLD-CCNC: 104 U/L
ALT SERPL-CCNC: 10 U/L
AST SERPL-CCNC: 20 U/L
BILIRUB SERPL-MCNC: 0.3 MG/DL
BUN SERPL-MCNC: 12 MG/DL
CHLORIDE SERPL-SCNC: 106 MMOL/L
CHOLEST SERPL-MCNC: 144 MG/DL
CO2 SERPL-SCNC: 25 MMOL/L
CREAT SERPL-MCNC: 0.85 MG/DL
EGFR: 66 ML/MIN/1.73M2
ESTIMATED AVERAGE GLUCOSE: 117 MG/DL
GLUCOSE SERPL-MCNC: 88 MG/DL
HBA1C MFR BLD HPLC: 5.7 %
HCT VFR BLD CALC: 36.4 %
HDLC SERPL-MCNC: 47 MG/DL
HGB BLD-MCNC: 11 G/DL
INR PPP: 1.88 RATIO
LDLC SERPL CALC-MCNC: 79 MG/DL
NONHDLC SERPL-MCNC: 97 MG/DL
POTASSIUM SERPL-SCNC: 4.2 MMOL/L
PROT SERPL-MCNC: 7.1 G/DL
PT BLD: 20.9 SEC
SODIUM SERPL-SCNC: 144 MMOL/L
TRIGL SERPL-MCNC: 95 MG/DL

## 2024-08-15 ENCOUNTER — APPOINTMENT (OUTPATIENT)
Dept: HEART AND VASCULAR | Facility: CLINIC | Age: 89
End: 2024-08-15

## 2024-08-20 ENCOUNTER — APPOINTMENT (OUTPATIENT)
Dept: HEART AND VASCULAR | Facility: CLINIC | Age: 89
End: 2024-08-20

## 2024-08-20 VITALS
OXYGEN SATURATION: 97 % | HEART RATE: 71 BPM | SYSTOLIC BLOOD PRESSURE: 154 MMHG | TEMPERATURE: 98.2 F | DIASTOLIC BLOOD PRESSURE: 82 MMHG | HEIGHT: 64 IN | BODY MASS INDEX: 22.53 KG/M2 | WEIGHT: 132 LBS

## 2024-08-20 PROCEDURE — 99214 OFFICE O/P EST MOD 30 MIN: CPT | Mod: 25

## 2024-08-20 PROCEDURE — 96372 THER/PROPH/DIAG INJ SC/IM: CPT

## 2024-08-20 PROCEDURE — 36415 COLL VENOUS BLD VENIPUNCTURE: CPT

## 2024-08-20 RX ORDER — CYANOCOBALAMIN 1000 UG/ML
1000 INJECTION INTRAMUSCULAR; SUBCUTANEOUS
Qty: 0 | Refills: 0 | Status: COMPLETED | OUTPATIENT
Start: 2024-08-20

## 2024-08-20 RX ADMIN — CYANOCOBALAMINE 0 MCG/ML: 1000 INJECTION INTRAMUSCULAR; SUBCUTANEOUS at 00:00

## 2024-08-20 NOTE — PHYSICAL EXAM
[Well Developed] : well developed [Well Nourished] : well nourished [No Acute Distress] : no acute distress [No Carotid Bruit] : no carotid bruit [Normal S1, S2] : normal S1, S2 [No Murmur] : no murmur [Clear Lung Fields] : clear lung fields [Good Air Entry] : good air entry [No Respiratory Distress] : no respiratory distress  [Normal Gait] : normal gait [Moves all extremities] : moves all extremities [No Focal Deficits] : no focal deficits [Normal Speech] : normal speech [Alert and Oriented] : alert and oriented [Normal memory] : normal memory [de-identified] : 1+ edema bilaterally

## 2024-08-20 NOTE — PHYSICAL EXAM
[Well Developed] : well developed [Well Nourished] : well nourished [No Acute Distress] : no acute distress [No Carotid Bruit] : no carotid bruit [Normal S1, S2] : normal S1, S2 [No Murmur] : no murmur [Clear Lung Fields] : clear lung fields [Good Air Entry] : good air entry [No Respiratory Distress] : no respiratory distress  [Normal Gait] : normal gait [Moves all extremities] : moves all extremities [No Focal Deficits] : no focal deficits [Normal Speech] : normal speech [Alert and Oriented] : alert and oriented [Normal memory] : normal memory [de-identified] : 1+ edema bilaterally

## 2024-08-20 NOTE — HISTORY OF PRESENT ILLNESS
[FreeTextEntry1] : Pt. is an 89-year-old F with PMHx of CAD (s/p PCI x2  - medically managed), Afib (on Warfarin), HTN, HLD and DM2 who presents today for follow-up.   Since her last visit, she remains to overall fell well. She continues to walk 8-10 blocks consecutively without complications. She reports no chest pains or dyspnea.   She is noted with some edema but denies any orthopnea, pnd or leg discomfort.   Blood pressures is slightly elevated in office. She did reports skipping her early dose of Atenolol this morning. Her at home blood pressure readings are usually mid 140's /70'smmHg.   She has stopped her statin medication.   She is noted with mild anemia but reports no bleeding complicatoins.   Patient denies any palpitations, shortness of breath at rest, dizziness, falls, syncope or neuro focal deficits.  ================================== Labs (2024) K/Creat: 3.7/0.69, A1c: 5.7% Labs (2024) Lipid profile: T, TC: 150, HDL: 43, LDL: 88  CCTA (2023): Cardiac: 1.  Patent stent noted in proximal RCA. 2.  Severe stenosis of proximal LAD, mid LAD and ramus intermedius --> FFR positive 3.  Moderate stenosis in RPDA 4.  Calcific plaque obscures the lumen of RPDA, D1, and OM1 which precludes stenosis severity assessment. 5.  Remaining segments demonstrate non-obstructive coronary artery disease. 6.  No evidence of left atrial appendage thrombus.  Pharm NST (2023): MPI is abnormal; small area of mild ischemia in apex; evidence of ischemic dilatation of LV; normal stress EKG.   ECHO (10/2023): EF 53%, mild-moderate MR ECHO (2022): mild-moderate MR, minimal AR, mildly dilated left atrium, mild LVH, EF 56%, normal wall motion.

## 2024-08-20 NOTE — ASSESSMENT
[FreeTextEntry1] : Pt. is an 89-year-old F with PMHx of CAD (s/p PCI x2 2011 - medically managed), Afib (on Warfarin), HTN, HLD and DM2 who presents today for follow-up.  PAF on coumadin check inr CAD severe 3 vessel disease no cp HTn patient is on amlodipine 10 mg p.o. daily atenolol 50 mg p.o. daily blood pressure is adequately controlled for age  Hyperlipidemia on rosuvastatin 5 mg last LDL 79 total cholesterol 144 HDL 47 Diabetes mellitus patient is on metformin at 1000 mg p.o. twice daily hemoglobin A1c 0.7

## 2024-08-20 NOTE — DISCUSSION/SUMMARY
[FreeTextEntry1] : Pt. is an 89-year-old F with PMHx of CAD (s/p PCI x2 2011 - medically managed), Afib (on Warfarin), HTN, HLD and DM2 who presents today for follow-up.   CAD: Active without chest pains of dyspnea. EKG SR 63 bpm with 1st degree block. Significant CAD not likely. Will continue with risk factor optimization, ASA and restart statin. Labs drawn today.   HLD: LDL goal < 70. Labs sent today. Advised to restart her Crestor  5mg QD.  I, Dr. Nixon, personally performed the evaluation and management (E/M) services for this established patient who presents today with (a) new problem(s)/exacerbation of (an) existing condition(s). That E/M includes conducting the clinically appropriate interval history &/or exam, assessing all new/exacerbated conditions, and establishing a new plan of care. Today, my ACP, Sophia Hall, was here to observe &/or participate in the visit & follow plan of care established by me.  AFIB: Currently in SR. She remains asymptomatic. INR draw sent today. Goal 2-3. Continue with 2.5mg TAB QD with taking 2 TABS on MWF. Adjust if needed.   HTN: At home pressures mid 140's / 70'smmHg acceptable. Continue with Amlodipine 10mg and Atenolol 50mg BID.   DMII: A1c goal < 7.0. Labs sent today. Continue with Metformin 100mg BID. Given elevated risk for PAD, check for claudications, she denies.   Follow up in 1 month for INR check.

## 2024-08-20 NOTE — REVIEW OF SYSTEMS
[Lower Ext Edema] : lower extremity edema [Fever] : no fever [Chills] : no chills [SOB] : no shortness of breath [Dyspnea on exertion] : not dyspnea during exertion [Chest Discomfort] : no chest discomfort [Palpitations] : no palpitations [Orthopnea] : no orthopnea [Syncope] : no syncope [Cough] : no cough [Nausea] : no nausea [Vomiting] : no vomiting [Change In The Stool] : no change in stool [Diarrhea] : diarrhea [Blood in Stool] : no blood in stool [Dizziness] : no dizziness [Numbness (Hypoesthesia)] : no numbness [Weakness] : no weakness [Speech Disturbance] : no speech disturbance [Easy Bleeding] : no tendency for easy bleeding

## 2024-08-21 LAB
INR PPP: 2.42 RATIO
PT BLD: 26.6 SEC

## 2024-10-01 ENCOUNTER — APPOINTMENT (OUTPATIENT)
Dept: HEART AND VASCULAR | Facility: CLINIC | Age: 89
End: 2024-10-01
Payer: MEDICARE

## 2024-10-01 VITALS
WEIGHT: 132 LBS | HEART RATE: 66 BPM | HEIGHT: 64 IN | SYSTOLIC BLOOD PRESSURE: 163 MMHG | BODY MASS INDEX: 22.53 KG/M2 | TEMPERATURE: 98 F | DIASTOLIC BLOOD PRESSURE: 69 MMHG | OXYGEN SATURATION: 97 %

## 2024-10-01 DIAGNOSIS — I25.10 ATHEROSCLEROTIC HEART DISEASE OF NATIVE CORONARY ARTERY W/OUT ANGINA PECTORIS: ICD-10-CM

## 2024-10-01 DIAGNOSIS — I48.20 CHRONIC ATRIAL FIBRILLATION, UNSP: ICD-10-CM

## 2024-10-01 DIAGNOSIS — E11.9 TYPE 2 DIABETES MELLITUS W/OUT COMPLICATIONS: ICD-10-CM

## 2024-10-01 DIAGNOSIS — I10 ESSENTIAL (PRIMARY) HYPERTENSION: ICD-10-CM

## 2024-10-01 DIAGNOSIS — E53.8 DEFICIENCY OF OTHER SPECIFIED B GROUP VITAMINS: ICD-10-CM

## 2024-10-01 PROCEDURE — G2211 COMPLEX E/M VISIT ADD ON: CPT

## 2024-10-01 PROCEDURE — 99214 OFFICE O/P EST MOD 30 MIN: CPT

## 2024-10-01 PROCEDURE — 36415 COLL VENOUS BLD VENIPUNCTURE: CPT

## 2024-10-03 LAB
INR PPP: 2.53 RATIO
PT BLD: 29.8 SEC

## 2024-10-07 NOTE — REASON FOR VISIT
[facility]  April 10, 2018    HPI:   Mr. 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  He is accompanied by a .     His postoperative course was complicated by rejection; respiratory failure and JOSE LUIS, which resolved with diuresis; surgical blood loss anemia, s/p 1u PRBCs 12/17/16; right pleural effusion, requiring pigtail placement 12/17/16; atrial and ventricular tachyarrhythmias; and Chagas reactivation 12/26, treated with benznidasole per Transplant ID.       He was started on azathioprine postoperatively due to reports of better outcomes.  His initial biopsy on 12/19/16 showed 2R ACR, which was treated with IV steroids.  Repeat biopsy 12/27/16 showed 2R/3A ACR, which was treated with IV steroids and thymoglobulin.  He had another episode of 2R ACR 2/10/17, which was treated with steroids.      His biopsies have since remained negative for significant rejection.  His graft function remained stable throughout his rejection episodes, and continues to remain stable with LVEF 57% and RVEF 48% per cMRI 12/2017.  Coronary angiogram 12/2017 showed no angiographic evidence of obstructive CAV, and RHC showed normal biventricular filling pressures with RA 3, PCW 10, CI 3.8.    Since his last visit, he states that he feels great.  He has been working and remains active with no exertional concerns.  He exercises regularly, and notes that his weight and BP remain stable.  He has a good appetite, and has been watching what he eats.  He otherwise denies chest pain, palpitations, shortness of breath, dizziness, headaches, fevers, chills, cough, sore throat, nausea, vomiting, diarrhea, and fluid  retention.    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 500 mg)  14 DPT: 2R, neg/neg treated w/IV pred x 3 (1000mg) + thymo x 3  21 DPT: 1R, neg/neg       Ventricular tachycardia, non-sustained (H) 01/04/2017     Priority: Medium     Elevated liver enzymes 12/20/2016     Priority: Medium     Reaction to QuantiFERON-TB test (QFT) without active tuberculosis 12/19/2016     Priority: Medium     Need for prophylactic antibiotic 12/19/2016     Priority: Medium     Heart failure (H) 12/12/2016     Priority: Medium     Heart replaced by transplant (H) 12/12/2016     Priority: Medium     Heart transplant candidate 12/11/2016     Priority: Medium     ACP (advance care planning) 10/10/2016     Priority: Medium     Advance Care Planning 10/10/2016: Receipt of ACP document:  Received: invalid HCD document dated 8-4-16.  Document not previously scanned. Validation form completed indicating invalid document. Copy sent to client with information and facilitation resources. Validation form sent to be scanned as notation of invalid document received. Confirmed/documented designated decision maker(s).  Added by Namrata Neely RN Advance Care Planning Liaison with [facility]             Chest pain 07/11/2016     Priority: Medium     Chronic systolic heart failure (H)      Priority: Medium     CHF (congestive heart failure) (H) 06/17/2016     Priority: Medium     Cardiac defibrillator in situ-[employer], dual chamber - Not Dependent 06/16/2016     Priority: Medium     Chagas cardiomyopathy 05/26/2016     Priority: Medium       PAST MEDICAL HISTORY:  Past Medical History:   Diagnosis Date     Chagas  cardiomyopathy      Chronic systolic heart failure (H)      Diabetes (H)     Steroid induced; 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 suspected , but unclear dx       CURRENT MEDICATIONS:  Prescription Medications as of 4/10/2018             pravastatin (PRAVACHOL) 20 MG tablet Take 1 tablet (20 mg) by mouth every evening    mycophenolate (GENERIC EQUIVALENT) 250 MG capsule Take 6 capsules (1,500 mg) by mouth 2 times daily    aspirin 81 MG EC tablet Take 1 tablet (81 mg) by mouth daily    tacrolimus (GENERIC EQUIVALENT) 1 MG capsule Take 2 mg in the morning; take 1 mg in the evening.    benzoyl peroxide 5 % LOTN lotion Apply topically At Bedtime    ketoconazole (NIZORAL) 2 % shampoo Apply topically three times a week Alternate with Head and Shoulders.    multivitamin, therapeutic with minerals (THERA-VIT-M) TABS tablet Take 1 tablet by mouth daily    guaiFENesin-dextromethorphan (ROBITUSSIN DM) 100-10 MG/5ML syrup Take 5 mLs by mouth every 4 hours as needed for cough    fluticasone (FLONASE) 50 MCG/ACT spray Spray 1 spray into both nostrils daily      Facility Administered Medications as of 4/10/2018             lidocaine (LMX4) kit Apply topically once as needed for mild pain          ROS:   Constitutional: No fever, chills, or sweats. No weight gain/loss.   ENT: No visual disturbance, ear ache, epistaxis, sore throat.   Allergies/Immunologic: Negative.   Respiratory: No cough, hemoptysis.   Cardiovascular: As per HPI.   GI: No nausea, vomiting, hematemesis, melena, or hematochezia.   : No urinary frequency, dysuria, or hematuria.   Integument: Negative.   Psychiatric: Negative.   Neuro: Negative.   Endocrinology: Negative.   Musculoskeletal:  "Negative    Exam:  /72 (BP Location: Right arm, Patient Position: Chair, Cuff Size: Adult Regular)  Pulse 88  Ht 1.69 m (5' 6.53\")  Wt 71.1 kg (156 lb 11.2 oz)  SpO2 99%  BMI 24.89 kg/m2  In general, the patient is a pleasant male in no apparent distress.    HEENT: NC/AT. PERRLA. EOMI.  Sclerae white, not injected.    Neck:  No adenopathy, No thyromegaly.    COR: No jugular venous distention when sitting upright.  RRR.  Normal S1 S2 splits physiologically.  No murmur, rub click, or gallop.    Lungs:  CTA. No rhonchi.    Abdomen: soft, nontender, nondistended.  No organomegaly.  Extremities:  No clubbing, cyanosis, or LE edema.    Neuro: Alert & Oriented x 3, grossly non focal.    Labs:  CBC RESULTS:   Lab Results   Component Value Date    WBC 4.2 04/10/2018    RBC 4.62 04/10/2018    HGB 13.6 04/10/2018    HCT 42.6 04/10/2018    MCV 92 04/10/2018    MCH 29.4 04/10/2018    MCHC 31.9 04/10/2018    RDW 13.2 04/10/2018     04/10/2018       BMP RESULTS:  Lab Results   Component Value Date     04/10/2018    POTASSIUM 4.1 04/10/2018    CHLORIDE 110 (H) 04/10/2018    CO2 24 04/10/2018    ANIONGAP 7 04/10/2018    GLC 86 04/10/2018    BUN 18 04/10/2018    CR 0.92 04/10/2018    GFRESTIMATED 88 04/10/2018    GFRESTBLACK >90 04/10/2018    DAISY 9.2 04/10/2018      LIPID RESULTS:  Lab Results   Component Value Date    CHOL 210 (H) 04/10/2018    HDL 55 04/10/2018     (H) 04/10/2018    TRIG 81 04/10/2018    NHDL 155 (H) 04/10/2018       IMMUNOSUPPRESSANT LEVELS  Lab Results   Component Value Date    TACROL 10.4 12/14/2017    DOSTAC Not Provided 12/14/2017       No components found for: CK  Lab Results   Component Value Date    MAG 1.8 12/14/2017     Lab Results   Component Value Date    A1C 7.5 (H) 06/02/2017     Lab Results   Component Value Date    PHOS 2.9 12/14/2017     Lab Results   Component Value Date    NTBNPI 209 10/21/2017     Lab Results   Component Value Date    LUCA GARCIA 12/14/2017 "    SAICELL Class I 12/14/2017    [id number] None 12/14/2017    [id number] None 12/14/2017    SAIREPCOM  12/14/2017     Test performed by modified procedure. Serum heat inactivated and tested by   a modified ([place]) protocol including fetal calf serum addition.   High-risk, mfi >3,000. Mod-risk, mfi 500-3,000.        Lab Results   Component Value Date    SAIITESTME SA FCS 12/14/2017    SAIICELL Class II 12/14/2017    [id number] None 12/14/2017    [id number] None 12/14/2017    SAIIREPCOM  12/14/2017     Test performed by modified procedure. Serum heat inactivated and tested by   a modified ([place]) protocol including fetal calf serum addition.   High-risk, mfi >3,000. Mod-risk, mfi 500-3,000.        Lab Results   Component Value Date    CSPEC Plasma, EDTA anticoagulant 12/14/2017       Diagnostic Studies:  cMRI:  12/2017  1. The LV is normal in cavity size and wall thickness. The global systolic function is normal. The LVEF is 57%. There are no regional wall motion abnormalities.  2. The RV is normal in cavity size. The global systolic function is mildly reduced. The RVEF is 48%.   3. There is bi-atrial enlargement secondary to heart transplantation.  4. There is no significant valvular disease.   5. Late gadolinium enhancement imaging shows no MI, fibrosis or infiltrative disease.   6. There is no pericardial effusion or thickening.  7.  There is no intracardiac thrombus.    [facility]:  12/2017        Coronary angiogram:  12/2017  1. Both coronary arteries arise from their respective cusps.  2. Dominance: Right  3. The LM is without angiographic evidence of disease.   4. The LAD gives rise to septal perforators and a large D1 and a small D2 and has no significant disease.    5. LCX gives rise to a small OM1 and OM2 vessels without significant stenosis  6. RCA (dominant) gives rise to PL branches and supplies PDA with no significant disease    Adequate anticoagulation was was obtained with UFH.  A fred wire was positioned in  the LM.  A [employer] [place] catheter was advanced across the wire into the vessel.       The minimal lumen area of the LAD was 6.6 mm2.  The intima media thickness of the LAD was 0.2 mm.    Assessment and Plan:  Mr. 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  He is accompanied by a .    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       He was started on azathioprine postoperatively due to reports of better outcomes.  His initial biopsy on 12/19/16 showed 2R ACR, which was treated with IV steroids.  Repeat biopsy 12/27/16 showed 2R/3A ACR, which was treated with IV steroids and thymoglobulin.  He had another episode of 2R ACR 2/10/17, which was treated with steroids.      His biopsies have since remained negative for significant rejection.  His graft function remained stable throughout his rejection episodes, and continues to remain stable with LVEF 57% and RVEF 48% per cMRI 12/2017.  Coronary angiogram 12/2017 showed no angiographic evidence of obstructive CAV, and RHC showed normal biventricular filling pressures with RA 3, PCW 10, CI 3.8.    Mr. Iglesia Shetty continues to do well following his transplant.  His weight, volume status, and BP remain stable.  Labs today show stable electrolytes, kidney function, liver function, and blood counts.  His cholesterol panel showed elevated total cholesterol and  LDL, so will d/c pravastatin and start crestor 10mg daily.  Plan to repeat FLP in 4-6 months.    Change in immunosuppression: yes  Reason for Change: adjust tacro dosage to keep level within goal range  Other Changes:    - d/c pravastatin   - start crestor 10mg daily   - repeat FLP in 4-6 months  Follow-Up: per protocol    Next Biopsy: per protocol  Next Angiogram: 12/2018  Next Angiogram with IVUS: yes  Next Stress Test: per protocol      Low-level CMV Viremia  Levels remain low, plans to treat if escalates beyond 1500IU per ID.  Last levels checked 12/2017 were <137.       Chagas   Chagas reactivated following his transplant (12/26/16).  He was treated with Benznidazole for 60 days, and Transplant ID noted that his heart has not yet been reinfected at this time.  Chagas PCRs have remained negative.  Transplant ID remains available if needed.      Latent TB  Treated with INH, per Transplant ID.    The above was reviewed with Mr. Iglesia Shetty via an .  He verbalized understanding and will call with further questions/concerns.    Mary Huffman, DNP, APRN, FNP-BC  Advanced Heart Failure Nurse Practitioner  [facility]  [phone]      CC  Patient Care Team:  No Ref-Primary, Physician as PCP - General  Broderick Gao MD, MD as MD (Cardiology)  Shauna Cuellar MD as MD (Infectious Diseases)  Marino Woo MD as MD (Dermatology)  Bryant Bartholomew MD as MD (Family Medicine - Sports Medicine)  Isabela Balbuena MD (Ophthalmology)  SELF, REFERRED   [Arrhythmia/ECG Abnorrmalities] : arrhythmia/ECG abnormalities

## 2024-10-07 NOTE — REVIEW OF SYSTEMS
[Fever] : no fever [Chills] : no chills [SOB] : no shortness of breath [Dyspnea on exertion] : not dyspnea during exertion [Chest Discomfort] : no chest discomfort [Lower Ext Edema] : lower extremity edema [Palpitations] : no palpitations [Orthopnea] : no orthopnea [Syncope] : no syncope [Cough] : no cough [Nausea] : no nausea [Vomiting] : no vomiting [Change In The Stool] : no change in stool [Diarrhea] : diarrhea [Blood in Stool] : no blood in stool [Dizziness] : no dizziness [Numbness (Hypoesthesia)] : no numbness [Weakness] : no weakness [Speech Disturbance] : no speech disturbance [Easy Bleeding] : no tendency for easy bleeding

## 2024-10-07 NOTE — PHYSICAL EXAM
[Well Developed] : well developed [Well Nourished] : well nourished [No Acute Distress] : no acute distress [No Carotid Bruit] : no carotid bruit [Normal S1, S2] : normal S1, S2 [No Murmur] : no murmur [Clear Lung Fields] : clear lung fields [Good Air Entry] : good air entry [No Respiratory Distress] : no respiratory distress  [Normal Gait] : normal gait [Moves all extremities] : moves all extremities [No Focal Deficits] : no focal deficits [Normal Speech] : normal speech [Alert and Oriented] : alert and oriented [Normal memory] : normal memory [de-identified] : 1+ edema bilaterally

## 2024-10-07 NOTE — ASSESSMENT
[FreeTextEntry1] : Pt. is an 89-year-old F with PMHx of CAD (s/p PCI x2 2011 - medically managed), Afib (on Warfarin), HTN, HLD and DM2 who presents today for follow-up.  PAF on coumadin check inr CAD severe 3 vessel disease no cp HTn patient is on amlodipine 10 mg p.o. daily atenolol 50 mg p.o. daily blood pressure is adequately controlled for age  Hyperlipidemia on rosuvastatin 5 mg last LDL 79 total cholesterol 144 HDL 47 Diabetes mellitus patient is on metformin at 1000 mg p.o. twice daily hemoglobin A1c 0.7 inr therapeutic 2.53

## 2024-10-17 ENCOUNTER — RX RENEWAL (OUTPATIENT)
Age: 89
End: 2024-10-17

## 2024-11-12 ENCOUNTER — APPOINTMENT (OUTPATIENT)
Dept: HEART AND VASCULAR | Facility: CLINIC | Age: 89
End: 2024-11-12
Payer: MEDICARE

## 2024-11-12 VITALS
HEIGHT: 64 IN | SYSTOLIC BLOOD PRESSURE: 156 MMHG | HEART RATE: 65 BPM | OXYGEN SATURATION: 97 % | WEIGHT: 132 LBS | TEMPERATURE: 97.8 F | BODY MASS INDEX: 22.53 KG/M2 | DIASTOLIC BLOOD PRESSURE: 85 MMHG

## 2024-11-12 DIAGNOSIS — E11.9 TYPE 2 DIABETES MELLITUS W/OUT COMPLICATIONS: ICD-10-CM

## 2024-11-12 DIAGNOSIS — I25.10 ATHEROSCLEROTIC HEART DISEASE OF NATIVE CORONARY ARTERY W/OUT ANGINA PECTORIS: ICD-10-CM

## 2024-11-12 DIAGNOSIS — I48.20 CHRONIC ATRIAL FIBRILLATION, UNSP: ICD-10-CM

## 2024-11-12 DIAGNOSIS — I10 ESSENTIAL (PRIMARY) HYPERTENSION: ICD-10-CM

## 2024-11-12 PROCEDURE — 99214 OFFICE O/P EST MOD 30 MIN: CPT

## 2024-11-12 PROCEDURE — G2211 COMPLEX E/M VISIT ADD ON: CPT

## 2024-11-12 PROCEDURE — 36415 COLL VENOUS BLD VENIPUNCTURE: CPT

## 2024-11-15 ENCOUNTER — NON-APPOINTMENT (OUTPATIENT)
Age: 89
End: 2024-11-15

## 2024-11-18 LAB
ALBUMIN SERPL ELPH-MCNC: 4.2 G/DL
ALP BLD-CCNC: 108 U/L
ALT SERPL-CCNC: 13 U/L
ANION GAP SERPL CALC-SCNC: 13 MMOL/L
AST SERPL-CCNC: 21 U/L
BASOPHILS # BLD AUTO: 0.02 K/UL
BASOPHILS NFR BLD AUTO: 0.3 %
BILIRUB SERPL-MCNC: 0.2 MG/DL
BUN SERPL-MCNC: 9 MG/DL
CALCIUM SERPL-MCNC: 9.5 MG/DL
CHLORIDE SERPL-SCNC: 104 MMOL/L
CHOLEST SERPL-MCNC: 150 MG/DL
CO2 SERPL-SCNC: 27 MMOL/L
CREAT SERPL-MCNC: 0.76 MG/DL
EGFR: 75 ML/MIN/1.73M2
EOSINOPHIL # BLD AUTO: 0.2 K/UL
EOSINOPHIL NFR BLD AUTO: 2.9 %
GLUCOSE SERPL-MCNC: 125 MG/DL
HCT VFR BLD CALC: 37.7 %
HDLC SERPL-MCNC: 46 MG/DL
HGB BLD-MCNC: 11.4 G/DL
IMM GRANULOCYTES NFR BLD AUTO: 0.3 %
INR PPP: 2.13 RATIO
LDLC SERPL CALC-MCNC: 83 MG/DL
LYMPHOCYTES # BLD AUTO: 1.74 K/UL
LYMPHOCYTES NFR BLD AUTO: 25.4 %
MAN DIFF?: NORMAL
MCHC RBC-ENTMCNC: 27.7 PG
MCHC RBC-ENTMCNC: 30.2 G/DL
MCV RBC AUTO: 91.7 FL
MONOCYTES # BLD AUTO: 0.41 K/UL
MONOCYTES NFR BLD AUTO: 6 %
NEUTROPHILS # BLD AUTO: 4.47 K/UL
NEUTROPHILS NFR BLD AUTO: 65.1 %
NONHDLC SERPL-MCNC: 105 MG/DL
PLATELET # BLD AUTO: 387 K/UL
POTASSIUM SERPL-SCNC: 4.2 MMOL/L
PROT SERPL-MCNC: 7.3 G/DL
PT BLD: 25 SEC
RBC # BLD: 4.11 M/UL
RBC # FLD: 14.6 %
SODIUM SERPL-SCNC: 144 MMOL/L
TRIGL SERPL-MCNC: 119 MG/DL
WBC # FLD AUTO: 6.86 K/UL

## 2025-01-14 ENCOUNTER — APPOINTMENT (OUTPATIENT)
Dept: HEART AND VASCULAR | Facility: CLINIC | Age: 89
End: 2025-01-14
Payer: MEDICARE

## 2025-01-14 ENCOUNTER — NON-APPOINTMENT (OUTPATIENT)
Age: 89
End: 2025-01-14

## 2025-01-14 VITALS
TEMPERATURE: 97.3 F | BODY MASS INDEX: 22.88 KG/M2 | SYSTOLIC BLOOD PRESSURE: 156 MMHG | OXYGEN SATURATION: 94 % | WEIGHT: 134 LBS | HEIGHT: 64 IN | DIASTOLIC BLOOD PRESSURE: 67 MMHG | HEART RATE: 64 BPM

## 2025-01-14 DIAGNOSIS — E53.8 DEFICIENCY OF OTHER SPECIFIED B GROUP VITAMINS: ICD-10-CM

## 2025-01-14 DIAGNOSIS — I10 ESSENTIAL (PRIMARY) HYPERTENSION: ICD-10-CM

## 2025-01-14 DIAGNOSIS — E11.9 TYPE 2 DIABETES MELLITUS W/OUT COMPLICATIONS: ICD-10-CM

## 2025-01-14 DIAGNOSIS — I48.20 CHRONIC ATRIAL FIBRILLATION, UNSP: ICD-10-CM

## 2025-01-14 DIAGNOSIS — I34.0 NONRHEUMATIC MITRAL (VALVE) INSUFFICIENCY: ICD-10-CM

## 2025-01-14 DIAGNOSIS — I25.10 ATHEROSCLEROTIC HEART DISEASE OF NATIVE CORONARY ARTERY W/OUT ANGINA PECTORIS: ICD-10-CM

## 2025-01-14 PROCEDURE — 36415 COLL VENOUS BLD VENIPUNCTURE: CPT

## 2025-01-14 PROCEDURE — 93000 ELECTROCARDIOGRAM COMPLETE: CPT

## 2025-01-14 PROCEDURE — G2211 COMPLEX E/M VISIT ADD ON: CPT

## 2025-01-14 PROCEDURE — 99214 OFFICE O/P EST MOD 30 MIN: CPT

## 2025-01-15 LAB
ALBUMIN SERPL ELPH-MCNC: 4.1 G/DL
ALP BLD-CCNC: 116 U/L
ALT SERPL-CCNC: 18 U/L
ANION GAP SERPL CALC-SCNC: 12 MMOL/L
APTT BLD: 50 SEC
AST SERPL-CCNC: 24 U/L
BASOPHILS # BLD AUTO: 0.02 K/UL
BASOPHILS NFR BLD AUTO: 0.3 %
BILIRUB SERPL-MCNC: 0.3 MG/DL
BUN SERPL-MCNC: 12 MG/DL
CALCIUM SERPL-MCNC: 9.6 MG/DL
CHLORIDE SERPL-SCNC: 105 MMOL/L
CO2 SERPL-SCNC: 27 MMOL/L
CREAT SERPL-MCNC: 0.82 MG/DL
EGFR: 69 ML/MIN/1.73M2
EOSINOPHIL # BLD AUTO: 0.13 K/UL
EOSINOPHIL NFR BLD AUTO: 2 %
ESTIMATED AVERAGE GLUCOSE: 117 MG/DL
GLUCOSE SERPL-MCNC: 91 MG/DL
HBA1C MFR BLD HPLC: 5.7 %
HCT VFR BLD CALC: 35.6 %
HGB BLD-MCNC: 10.5 G/DL
IMM GRANULOCYTES NFR BLD AUTO: 0.3 %
LYMPHOCYTES # BLD AUTO: 1.48 K/UL
LYMPHOCYTES NFR BLD AUTO: 22.3 %
MAN DIFF?: NORMAL
MCHC RBC-ENTMCNC: 26.8 PG
MCHC RBC-ENTMCNC: 29.5 G/DL
MCV RBC AUTO: 90.8 FL
MONOCYTES # BLD AUTO: 0.52 K/UL
MONOCYTES NFR BLD AUTO: 7.8 %
NEUTROPHILS # BLD AUTO: 4.46 K/UL
NEUTROPHILS NFR BLD AUTO: 67.3 %
PLATELET # BLD AUTO: 311 K/UL
POTASSIUM SERPL-SCNC: 4 MMOL/L
PROT SERPL-MCNC: 7 G/DL
RBC # BLD: 3.92 M/UL
RBC # FLD: 15.3 %
SODIUM SERPL-SCNC: 144 MMOL/L
TSH SERPL-ACNC: 1.41 UIU/ML
VIT B12 SERPL-MCNC: >2000 PG/ML
WBC # FLD AUTO: 6.63 K/UL

## 2025-02-25 ENCOUNTER — LABORATORY RESULT (OUTPATIENT)
Age: 89
End: 2025-02-25

## 2025-02-25 ENCOUNTER — APPOINTMENT (OUTPATIENT)
Dept: HEART AND VASCULAR | Facility: CLINIC | Age: 89
End: 2025-02-25
Payer: MEDICARE

## 2025-02-25 VITALS
SYSTOLIC BLOOD PRESSURE: 160 MMHG | HEART RATE: 59 BPM | BODY MASS INDEX: 22.88 KG/M2 | OXYGEN SATURATION: 95 % | DIASTOLIC BLOOD PRESSURE: 69 MMHG | HEIGHT: 64 IN | WEIGHT: 134 LBS

## 2025-02-25 DIAGNOSIS — I48.20 CHRONIC ATRIAL FIBRILLATION, UNSP: ICD-10-CM

## 2025-02-25 PROCEDURE — G2211 COMPLEX E/M VISIT ADD ON: CPT

## 2025-02-25 PROCEDURE — 99214 OFFICE O/P EST MOD 30 MIN: CPT

## 2025-02-25 PROCEDURE — 36415 COLL VENOUS BLD VENIPUNCTURE: CPT

## 2025-04-08 ENCOUNTER — APPOINTMENT (OUTPATIENT)
Dept: HEART AND VASCULAR | Facility: CLINIC | Age: 89
End: 2025-04-08

## 2025-04-17 ENCOUNTER — APPOINTMENT (OUTPATIENT)
Dept: HEART AND VASCULAR | Facility: CLINIC | Age: 89
End: 2025-04-17
Payer: MEDICARE

## 2025-04-17 VITALS
TEMPERATURE: 98.3 F | DIASTOLIC BLOOD PRESSURE: 63 MMHG | WEIGHT: 130 LBS | BODY MASS INDEX: 22.2 KG/M2 | SYSTOLIC BLOOD PRESSURE: 138 MMHG | OXYGEN SATURATION: 99 % | HEIGHT: 64 IN | HEART RATE: 57 BPM

## 2025-04-17 DIAGNOSIS — I25.10 ATHEROSCLEROTIC HEART DISEASE OF NATIVE CORONARY ARTERY W/OUT ANGINA PECTORIS: ICD-10-CM

## 2025-04-17 DIAGNOSIS — E11.9 TYPE 2 DIABETES MELLITUS W/OUT COMPLICATIONS: ICD-10-CM

## 2025-04-17 DIAGNOSIS — I48.20 CHRONIC ATRIAL FIBRILLATION, UNSP: ICD-10-CM

## 2025-04-17 PROCEDURE — G2211 COMPLEX E/M VISIT ADD ON: CPT

## 2025-04-17 PROCEDURE — 99214 OFFICE O/P EST MOD 30 MIN: CPT

## 2025-04-17 PROCEDURE — 93000 ELECTROCARDIOGRAM COMPLETE: CPT

## 2025-04-18 LAB
ALBUMIN SERPL ELPH-MCNC: 4 G/DL
ALP BLD-CCNC: 113 U/L
ALT SERPL-CCNC: 16 U/L
ANION GAP SERPL CALC-SCNC: 12 MMOL/L
AST SERPL-CCNC: 24 U/L
BASOPHILS # BLD AUTO: 0.02 K/UL
BASOPHILS NFR BLD AUTO: 0.3 %
BILIRUB SERPL-MCNC: 0.2 MG/DL
BUN SERPL-MCNC: 12 MG/DL
CALCIUM SERPL-MCNC: 8.8 MG/DL
CHLORIDE SERPL-SCNC: 106 MMOL/L
CHOLEST SERPL-MCNC: 156 MG/DL
CO2 SERPL-SCNC: 25 MMOL/L
CREAT SERPL-MCNC: 0.98 MG/DL
EGFRCR SERPLBLD CKD-EPI 2021: 55 ML/MIN/1.73M2
EOSINOPHIL # BLD AUTO: 0.13 K/UL
EOSINOPHIL NFR BLD AUTO: 2.1 %
GLUCOSE SERPL-MCNC: 108 MG/DL
HCT VFR BLD CALC: 34.8 %
HDLC SERPL-MCNC: 47 MG/DL
HGB BLD-MCNC: 10.4 G/DL
IMM GRANULOCYTES NFR BLD AUTO: 0.2 %
INR PPP: 1.61 RATIO
LDLC SERPL-MCNC: 93 MG/DL
LYMPHOCYTES # BLD AUTO: 1.39 K/UL
LYMPHOCYTES NFR BLD AUTO: 22.8 %
MAN DIFF?: NORMAL
MCHC RBC-ENTMCNC: 27.1 PG
MCHC RBC-ENTMCNC: 29.9 G/DL
MCV RBC AUTO: 90.6 FL
MONOCYTES # BLD AUTO: 0.44 K/UL
MONOCYTES NFR BLD AUTO: 7.2 %
NEUTROPHILS # BLD AUTO: 4.11 K/UL
NEUTROPHILS NFR BLD AUTO: 67.4 %
NONHDLC SERPL-MCNC: 108 MG/DL
PLATELET # BLD AUTO: 310 K/UL
POTASSIUM SERPL-SCNC: 4.2 MMOL/L
PROT SERPL-MCNC: 6.7 G/DL
PT BLD: 19.1 SEC
RBC # BLD: 3.84 M/UL
RBC # FLD: 16 %
SODIUM SERPL-SCNC: 143 MMOL/L
TRIGL SERPL-MCNC: 81 MG/DL
TSH SERPL-ACNC: 1.65 UIU/ML
VIT B12 SERPL-MCNC: 580 PG/ML
WBC # FLD AUTO: 6.1 K/UL

## 2025-04-23 ENCOUNTER — APPOINTMENT (OUTPATIENT)
Dept: HEART AND VASCULAR | Facility: CLINIC | Age: 89
End: 2025-04-23

## 2025-06-04 ENCOUNTER — APPOINTMENT (OUTPATIENT)
Dept: HEART AND VASCULAR | Facility: CLINIC | Age: 89
End: 2025-06-04
Payer: MEDICARE

## 2025-06-04 VITALS
BODY MASS INDEX: 24.73 KG/M2 | DIASTOLIC BLOOD PRESSURE: 65 MMHG | WEIGHT: 131 LBS | SYSTOLIC BLOOD PRESSURE: 130 MMHG | HEART RATE: 64 BPM | OXYGEN SATURATION: 98 % | HEIGHT: 61 IN | TEMPERATURE: 97.8 F

## 2025-06-04 DIAGNOSIS — I48.20 CHRONIC ATRIAL FIBRILLATION, UNSP: ICD-10-CM

## 2025-06-04 DIAGNOSIS — E11.9 TYPE 2 DIABETES MELLITUS W/OUT COMPLICATIONS: ICD-10-CM

## 2025-06-04 DIAGNOSIS — I10 ESSENTIAL (PRIMARY) HYPERTENSION: ICD-10-CM

## 2025-06-04 PROCEDURE — 36415 COLL VENOUS BLD VENIPUNCTURE: CPT

## 2025-06-04 PROCEDURE — G2211 COMPLEX E/M VISIT ADD ON: CPT

## 2025-06-04 PROCEDURE — 99214 OFFICE O/P EST MOD 30 MIN: CPT

## 2025-06-04 RX ORDER — WARFARIN 2 MG/1
2 TABLET ORAL
Refills: 0 | Status: ACTIVE | COMMUNITY

## 2025-06-07 LAB
ALBUMIN SERPL ELPH-MCNC: 4 G/DL
ALP BLD-CCNC: 120 U/L
ALT SERPL-CCNC: 18 U/L
ANION GAP SERPL CALC-SCNC: 14 MMOL/L
AST SERPL-CCNC: 25 U/L
BILIRUB SERPL-MCNC: 0.2 MG/DL
BUN SERPL-MCNC: 16 MG/DL
CALCIUM SERPL-MCNC: 9.1 MG/DL
CHLORIDE SERPL-SCNC: 106 MMOL/L
CHOLEST SERPL-MCNC: 153 MG/DL
CO2 SERPL-SCNC: 22 MMOL/L
CREAT SERPL-MCNC: 0.89 MG/DL
EGFRCR SERPLBLD CKD-EPI 2021: 62 ML/MIN/1.73M2
ESTIMATED AVERAGE GLUCOSE: 120 MG/DL
GLUCOSE SERPL-MCNC: 115 MG/DL
HBA1C MFR BLD HPLC: 5.8 %
HCT VFR BLD CALC: 34 %
HDLC SERPL-MCNC: 52 MG/DL
HGB BLD-MCNC: 10.3 G/DL
INR PPP: 1.62 RATIO
LDLC SERPL-MCNC: 89 MG/DL
MCHC RBC-ENTMCNC: 27.2 PG
MCHC RBC-ENTMCNC: 30.3 G/DL
MCV RBC AUTO: 89.7 FL
NONHDLC SERPL-MCNC: 101 MG/DL
PLATELET # BLD AUTO: 300 K/UL
POTASSIUM SERPL-SCNC: 3.9 MMOL/L
PROT SERPL-MCNC: 6.8 G/DL
PT BLD: 19 SEC
RBC # BLD: 3.79 M/UL
RBC # FLD: 15.6 %
SODIUM SERPL-SCNC: 143 MMOL/L
TRIGL SERPL-MCNC: 57 MG/DL
TSH SERPL-ACNC: 1.17 UIU/ML
VIT B12 SERPL-MCNC: 585 PG/ML
WBC # FLD AUTO: 5.28 K/UL

## 2025-07-15 ENCOUNTER — APPOINTMENT (OUTPATIENT)
Dept: HEART AND VASCULAR | Facility: CLINIC | Age: 89
End: 2025-07-15
Payer: MEDICARE

## 2025-07-15 VITALS
TEMPERATURE: 97.3 F | HEIGHT: 61 IN | DIASTOLIC BLOOD PRESSURE: 69 MMHG | HEART RATE: 42 BPM | SYSTOLIC BLOOD PRESSURE: 156 MMHG | OXYGEN SATURATION: 97 % | BODY MASS INDEX: 25.3 KG/M2 | WEIGHT: 134 LBS

## 2025-07-15 PROCEDURE — 99214 OFFICE O/P EST MOD 30 MIN: CPT

## 2025-07-15 PROCEDURE — 93000 ELECTROCARDIOGRAM COMPLETE: CPT

## 2025-07-15 PROCEDURE — G2211 COMPLEX E/M VISIT ADD ON: CPT

## 2025-07-15 PROCEDURE — 36415 COLL VENOUS BLD VENIPUNCTURE: CPT

## 2025-07-16 LAB
ALBUMIN SERPL ELPH-MCNC: 4.3 G/DL
ALP BLD-CCNC: 122 U/L
ALT SERPL-CCNC: 16 U/L
ANION GAP SERPL CALC-SCNC: 15 MMOL/L
AST SERPL-CCNC: 28 U/L
BASOPHILS # BLD AUTO: 0.02 K/UL
BASOPHILS NFR BLD AUTO: 0.3 %
BILIRUB SERPL-MCNC: 0.3 MG/DL
BUN SERPL-MCNC: 14 MG/DL
CALCIUM SERPL-MCNC: 9.6 MG/DL
CHLORIDE SERPL-SCNC: 107 MMOL/L
CO2 SERPL-SCNC: 23 MMOL/L
CREAT SERPL-MCNC: 0.84 MG/DL
EGFRCR SERPLBLD CKD-EPI 2021: 66 ML/MIN/1.73M2
EOSINOPHIL # BLD AUTO: 0.06 K/UL
EOSINOPHIL NFR BLD AUTO: 0.9 %
ESTIMATED AVERAGE GLUCOSE: 117 MG/DL
FERRITIN SERPL-MCNC: 14 NG/ML
GLUCOSE SERPL-MCNC: 93 MG/DL
HBA1C MFR BLD HPLC: 5.7 %
HCT VFR BLD CALC: 37.5 %
HGB BLD-MCNC: 11.6 G/DL
IMM GRANULOCYTES NFR BLD AUTO: 0.3 %
INR PPP: 2.34 RATIO
LYMPHOCYTES # BLD AUTO: 1.5 K/UL
LYMPHOCYTES NFR BLD AUTO: 21.4 %
MAN DIFF?: NORMAL
MCHC RBC-ENTMCNC: 27.9 PG
MCHC RBC-ENTMCNC: 30.9 G/DL
MCV RBC AUTO: 90.1 FL
MONOCYTES # BLD AUTO: 0.51 K/UL
MONOCYTES NFR BLD AUTO: 7.3 %
NEUTROPHILS # BLD AUTO: 4.89 K/UL
NEUTROPHILS NFR BLD AUTO: 69.8 %
PLATELET # BLD AUTO: 309 K/UL
POTASSIUM SERPL-SCNC: 4.6 MMOL/L
PROT SERPL-MCNC: 7.6 G/DL
PT BLD: 27.4 SEC
RBC # BLD: 4.16 M/UL
RBC # FLD: 15.6 %
SODIUM SERPL-SCNC: 146 MMOL/L
VIT B12 SERPL-MCNC: 720 PG/ML
WBC # FLD AUTO: 7 K/UL

## 2025-09-02 ENCOUNTER — APPOINTMENT (OUTPATIENT)
Dept: HEART AND VASCULAR | Facility: CLINIC | Age: 89
End: 2025-09-02
Payer: MEDICARE

## 2025-09-02 VITALS
BODY MASS INDEX: 24.55 KG/M2 | HEART RATE: 77 BPM | SYSTOLIC BLOOD PRESSURE: 150 MMHG | WEIGHT: 130 LBS | OXYGEN SATURATION: 88 % | TEMPERATURE: 97 F | HEIGHT: 61 IN | DIASTOLIC BLOOD PRESSURE: 73 MMHG

## 2025-09-02 DIAGNOSIS — I48.20 CHRONIC ATRIAL FIBRILLATION, UNSP: ICD-10-CM

## 2025-09-02 DIAGNOSIS — E53.8 DEFICIENCY OF OTHER SPECIFIED B GROUP VITAMINS: ICD-10-CM

## 2025-09-02 PROCEDURE — 99214 OFFICE O/P EST MOD 30 MIN: CPT | Mod: 25

## 2025-09-02 PROCEDURE — 36415 COLL VENOUS BLD VENIPUNCTURE: CPT

## 2025-09-02 PROCEDURE — 96372 THER/PROPH/DIAG INJ SC/IM: CPT

## 2025-09-02 RX ORDER — CYANOCOBALAMIN 1000 UG/ML
1000 INJECTION, SOLUTION INTRAMUSCULAR; SUBCUTANEOUS
Qty: 0 | Refills: 0 | Status: COMPLETED | OUTPATIENT
Start: 2025-09-02

## 2025-09-02 RX ADMIN — CYANOCOBALAMIN 0 MCG/ML: 1000 INJECTION, SOLUTION INTRAMUSCULAR at 00:00

## 2025-09-03 LAB
INR PPP: 1.96 RATIO
PT BLD: 22.6 SEC